# Patient Record
Sex: FEMALE | Race: WHITE | Employment: PART TIME | URBAN - METROPOLITAN AREA
[De-identification: names, ages, dates, MRNs, and addresses within clinical notes are randomized per-mention and may not be internally consistent; named-entity substitution may affect disease eponyms.]

---

## 2017-02-07 ENCOUNTER — OFFICE VISIT (OUTPATIENT)
Dept: SURGERY | Age: 57
End: 2017-02-07

## 2017-02-07 VITALS — SYSTOLIC BLOOD PRESSURE: 124 MMHG | BODY MASS INDEX: 22.76 KG/M2 | WEIGHT: 141 LBS | DIASTOLIC BLOOD PRESSURE: 80 MMHG

## 2017-02-07 DIAGNOSIS — K44.9 HIATAL HERNIA: Primary | ICD-10-CM

## 2017-02-07 DIAGNOSIS — K21.00 HIATAL HERNIA WITH GERD AND ESOPHAGITIS: ICD-10-CM

## 2017-02-07 DIAGNOSIS — K44.9 HIATAL HERNIA WITH GERD AND ESOPHAGITIS: ICD-10-CM

## 2017-02-07 PROCEDURE — 99203 OFFICE O/P NEW LOW 30 MIN: CPT | Performed by: SURGERY

## 2017-02-07 RX ORDER — INSULIN DETEMIR 100 [IU]/ML
28 INJECTION, SOLUTION SUBCUTANEOUS NIGHTLY
COMMUNITY
Start: 2016-11-14

## 2017-02-07 ASSESSMENT — ENCOUNTER SYMPTOMS
GASTROINTESTINAL NEGATIVE: 1
ALLERGIC/IMMUNOLOGIC NEGATIVE: 1
EYES NEGATIVE: 1
RESPIRATORY NEGATIVE: 1

## 2017-02-13 ENCOUNTER — TELEPHONE (OUTPATIENT)
Dept: SURGERY | Age: 57
End: 2017-02-13

## 2017-02-13 ENCOUNTER — HOSPITAL ENCOUNTER (OUTPATIENT)
Dept: GENERAL RADIOLOGY | Age: 57
Discharge: HOME | End: 2017-02-13
Attending: SURGERY | Admitting: SURGERY

## 2017-02-13 DIAGNOSIS — K44.9 HIATAL HERNIA: ICD-10-CM

## 2017-02-13 DIAGNOSIS — K44.9 DIAPHRAGMATIC HERNIA WITHOUT OBSTRUCTION OR GANGRENE: ICD-10-CM

## 2017-03-03 ENCOUNTER — HOSPITAL ENCOUNTER (OUTPATIENT)
Dept: OTHER | Age: 57
Discharge: HOME | End: 2017-03-03
Attending: SURGERY | Admitting: SURGERY

## 2017-03-03 LAB
ABO/RH: NORMAL
ANION GAP SERPL CALCULATED.3IONS-SCNC: 17 MMOL/L (ref 3–16)
ANTIBODY SCREEN: NORMAL
BASOPHILS ABSOLUTE: 0.1 K/UL (ref 0–0.2)
BASOPHILS RELATIVE PERCENT: 0.7 %
BUN BLDV-MCNC: 23 MG/DL (ref 7–20)
CALCIUM SERPL-MCNC: 9.8 MG/DL (ref 8.3–10.6)
CHLORIDE BLD-SCNC: 92 MMOL/L (ref 99–110)
CO2: 26 MMOL/L (ref 21–32)
CREAT SERPL-MCNC: 0.9 MG/DL (ref 0.6–1.1)
EOSINOPHILS ABSOLUTE: 0.5 K/UL (ref 0–0.6)
EOSINOPHILS RELATIVE PERCENT: 6.7 %
GFR AFRICAN AMERICAN: >60
GFR NON-AFRICAN AMERICAN: >60
GLUCOSE BLD-MCNC: 403 MG/DL (ref 70–99)
HCT VFR BLD CALC: 40.6 % (ref 36–48)
HEMOGLOBIN: 13.6 G/DL (ref 12–16)
LYMPHOCYTES ABSOLUTE: 2.4 K/UL (ref 1–5.1)
LYMPHOCYTES RELATIVE PERCENT: 30.2 %
MCH RBC QN AUTO: 30.8 PG (ref 26–34)
MCHC RBC AUTO-ENTMCNC: 33.4 G/DL (ref 31–36)
MCV RBC AUTO: 92.1 FL (ref 80–100)
MONOCYTES ABSOLUTE: 0.6 K/UL (ref 0–1.3)
MONOCYTES RELATIVE PERCENT: 6.9 %
NEUTROPHILS ABSOLUTE: 4.5 K/UL (ref 1.7–7.7)
NEUTROPHILS RELATIVE PERCENT: 55.5 %
PDW BLD-RTO: 12.5 % (ref 12.4–15.4)
PLATELET # BLD: 199 K/UL (ref 135–450)
PMV BLD AUTO: 10.8 FL (ref 5–10.5)
POTASSIUM SERPL-SCNC: 4.4 MMOL/L (ref 3.5–5.1)
RBC # BLD: 4.42 M/UL (ref 4–5.2)
SODIUM BLD-SCNC: 135 MMOL/L (ref 136–145)
WBC # BLD: 8.1 K/UL (ref 4–11)

## 2017-03-04 LAB
EKG ATRIAL RATE: 82 BPM
EKG DIAGNOSIS: NORMAL
EKG P AXIS: 53 DEGREES
EKG P-R INTERVAL: 200 MS
EKG Q-T INTERVAL: 368 MS
EKG QRS DURATION: 76 MS
EKG QTC CALCULATION (BAZETT): 429 MS
EKG R AXIS: 18 DEGREES
EKG T AXIS: 70 DEGREES
EKG VENTRICULAR RATE: 82 BPM

## 2017-03-04 PROCEDURE — 93010 ELECTROCARDIOGRAM REPORT: CPT | Performed by: INTERNAL MEDICINE

## 2017-03-09 ENCOUNTER — HOSPITAL ENCOUNTER (OUTPATIENT)
Dept: ENDOSCOPY | Age: 57
Discharge: HOME | End: 2017-03-06
Attending: INTERNAL MEDICINE | Admitting: INTERNAL MEDICINE

## 2017-03-10 ENCOUNTER — HOSPITAL ENCOUNTER (OUTPATIENT)
Dept: SURGERY | Age: 57
Discharge: HOME | End: 2017-02-14
Attending: SURGERY | Admitting: SURGERY

## 2017-03-10 DIAGNOSIS — K44.9 DIAPHRAGMATIC HERNIA WITHOUT OBSTRUCTION OR GANGRENE: ICD-10-CM

## 2017-03-13 ENCOUNTER — HOSPITAL ENCOUNTER (OUTPATIENT)
Dept: ENDOSCOPY | Age: 57
Discharge: HOME | End: 2017-03-13
Attending: INTERNAL MEDICINE | Admitting: INTERNAL MEDICINE

## 2017-03-13 LAB
GLUCOSE BLD-MCNC: 286 MG/DL (ref 70–99)
GLUCOSE BLD-MCNC: 289 MG/DL (ref 70–99)
PERFORMED ON: ABNORMAL
PERFORMED ON: ABNORMAL

## 2017-04-06 ENCOUNTER — HOSPITAL ENCOUNTER (OUTPATIENT)
Dept: ENDOSCOPY | Age: 57
Discharge: HOME | End: 2017-04-06
Attending: INTERNAL MEDICINE | Admitting: INTERNAL MEDICINE

## 2017-04-06 VITALS
SYSTOLIC BLOOD PRESSURE: 158 MMHG | OXYGEN SATURATION: 97 % | DIASTOLIC BLOOD PRESSURE: 115 MMHG | HEART RATE: 104 BPM | RESPIRATION RATE: 16 BRPM

## 2017-04-27 ENCOUNTER — OFFICE VISIT (OUTPATIENT)
Dept: SURGERY | Age: 57
End: 2017-04-27

## 2017-04-27 VITALS — SYSTOLIC BLOOD PRESSURE: 160 MMHG | WEIGHT: 134 LBS | DIASTOLIC BLOOD PRESSURE: 90 MMHG | BODY MASS INDEX: 21.63 KG/M2

## 2017-04-27 DIAGNOSIS — K44.9 PARAESOPHAGEAL HIATAL HERNIA: Primary | ICD-10-CM

## 2017-04-27 PROCEDURE — 99024 POSTOP FOLLOW-UP VISIT: CPT | Performed by: SURGERY

## 2017-04-27 RX ORDER — SULFAMETHOXAZOLE AND TRIMETHOPRIM 800; 160 MG/1; MG/1
1 TABLET ORAL 2 TIMES DAILY
Qty: 20 TABLET | Refills: 0 | Status: SHIPPED | OUTPATIENT
Start: 2017-04-27 | End: 2017-05-07

## 2017-05-18 ENCOUNTER — OFFICE VISIT (OUTPATIENT)
Dept: SURGERY | Age: 57
End: 2017-05-18

## 2017-05-18 VITALS — SYSTOLIC BLOOD PRESSURE: 130 MMHG | DIASTOLIC BLOOD PRESSURE: 80 MMHG | WEIGHT: 132 LBS | BODY MASS INDEX: 21.31 KG/M2

## 2017-05-18 DIAGNOSIS — K44.9 PARAESOPHAGEAL HIATAL HERNIA: Primary | ICD-10-CM

## 2017-05-18 PROCEDURE — 99024 POSTOP FOLLOW-UP VISIT: CPT | Performed by: SURGERY

## 2019-10-07 ENCOUNTER — OFFICE VISIT (OUTPATIENT)
Dept: ENT CLINIC | Age: 59
End: 2019-10-07
Payer: COMMERCIAL

## 2019-10-07 VITALS — SYSTOLIC BLOOD PRESSURE: 124 MMHG | OXYGEN SATURATION: 94 % | HEART RATE: 86 BPM | DIASTOLIC BLOOD PRESSURE: 72 MMHG

## 2019-10-07 DIAGNOSIS — R04.0 ACUTE ANTERIOR EPISTAXIS: Primary | ICD-10-CM

## 2019-10-07 PROCEDURE — 1036F TOBACCO NON-USER: CPT | Performed by: OTOLARYNGOLOGY

## 2019-10-07 PROCEDURE — 3017F COLORECTAL CA SCREEN DOC REV: CPT | Performed by: OTOLARYNGOLOGY

## 2019-10-07 PROCEDURE — 99202 OFFICE O/P NEW SF 15 MIN: CPT | Performed by: OTOLARYNGOLOGY

## 2019-10-07 PROCEDURE — G8421 BMI NOT CALCULATED: HCPCS | Performed by: OTOLARYNGOLOGY

## 2019-10-07 PROCEDURE — G8427 DOCREV CUR MEDS BY ELIG CLIN: HCPCS | Performed by: OTOLARYNGOLOGY

## 2019-10-07 PROCEDURE — 30901 CONTROL OF NOSEBLEED: CPT | Performed by: OTOLARYNGOLOGY

## 2019-10-07 PROCEDURE — G8484 FLU IMMUNIZE NO ADMIN: HCPCS | Performed by: OTOLARYNGOLOGY

## 2019-10-07 ASSESSMENT — ENCOUNTER SYMPTOMS
EYES NEGATIVE: 1
SINUS PRESSURE: 0
SORE THROAT: 0
VOICE CHANGE: 0
SINUS PAIN: 0
TROUBLE SWALLOWING: 0
RHINORRHEA: 0
FACIAL SWELLING: 0
ALLERGIC/IMMUNOLOGIC NEGATIVE: 1
RESPIRATORY NEGATIVE: 1

## 2019-10-10 ENCOUNTER — TELEPHONE (OUTPATIENT)
Dept: ENT CLINIC | Age: 59
End: 2019-10-10

## 2019-10-14 ENCOUNTER — TELEPHONE (OUTPATIENT)
Dept: ENT CLINIC | Age: 59
End: 2019-10-14

## 2019-10-15 ENCOUNTER — OFFICE VISIT (OUTPATIENT)
Dept: ENT CLINIC | Age: 59
End: 2019-10-15
Payer: MEDICAID

## 2019-10-15 VITALS — HEART RATE: 77 BPM | OXYGEN SATURATION: 94 % | DIASTOLIC BLOOD PRESSURE: 76 MMHG | SYSTOLIC BLOOD PRESSURE: 114 MMHG

## 2019-10-15 DIAGNOSIS — R04.0 ACUTE ANTERIOR EPISTAXIS: Primary | ICD-10-CM

## 2019-10-15 PROCEDURE — G8484 FLU IMMUNIZE NO ADMIN: HCPCS | Performed by: OTOLARYNGOLOGY

## 2019-10-15 PROCEDURE — G8427 DOCREV CUR MEDS BY ELIG CLIN: HCPCS | Performed by: OTOLARYNGOLOGY

## 2019-10-15 PROCEDURE — 30901 CONTROL OF NOSEBLEED: CPT | Performed by: OTOLARYNGOLOGY

## 2019-10-15 PROCEDURE — 1036F TOBACCO NON-USER: CPT | Performed by: OTOLARYNGOLOGY

## 2019-10-15 PROCEDURE — G8421 BMI NOT CALCULATED: HCPCS | Performed by: OTOLARYNGOLOGY

## 2019-10-15 PROCEDURE — 3017F COLORECTAL CA SCREEN DOC REV: CPT | Performed by: OTOLARYNGOLOGY

## 2020-11-03 ENCOUNTER — OFFICE VISIT (OUTPATIENT)
Dept: ENT CLINIC | Age: 60
End: 2020-11-03
Payer: MEDICARE

## 2020-11-03 VITALS
WEIGHT: 132 LBS | HEART RATE: 85 BPM | SYSTOLIC BLOOD PRESSURE: 165 MMHG | DIASTOLIC BLOOD PRESSURE: 101 MMHG | BODY MASS INDEX: 21.31 KG/M2 | TEMPERATURE: 97.7 F

## 2020-11-03 PROCEDURE — G8484 FLU IMMUNIZE NO ADMIN: HCPCS | Performed by: OTOLARYNGOLOGY

## 2020-11-03 PROCEDURE — G8427 DOCREV CUR MEDS BY ELIG CLIN: HCPCS | Performed by: OTOLARYNGOLOGY

## 2020-11-03 PROCEDURE — G8420 CALC BMI NORM PARAMETERS: HCPCS | Performed by: OTOLARYNGOLOGY

## 2020-11-03 PROCEDURE — 1036F TOBACCO NON-USER: CPT | Performed by: OTOLARYNGOLOGY

## 2020-11-03 PROCEDURE — 30903 CONTROL OF NOSEBLEED: CPT | Performed by: OTOLARYNGOLOGY

## 2020-11-03 PROCEDURE — 3017F COLORECTAL CA SCREEN DOC REV: CPT | Performed by: OTOLARYNGOLOGY

## 2020-11-03 RX ORDER — AZITHROMYCIN 250 MG/1
TABLET, FILM COATED ORAL
Qty: 1 PACKET | Refills: 0 | Status: SHIPPED | OUTPATIENT
Start: 2020-11-03 | End: 2022-03-31

## 2020-11-03 RX ORDER — LOSARTAN POTASSIUM 25 MG/1
25 TABLET ORAL DAILY
COMMUNITY
Start: 2020-05-18 | End: 2022-09-12

## 2020-11-03 RX ORDER — ROSUVASTATIN CALCIUM 20 MG/1
20 TABLET, COATED ORAL DAILY
COMMUNITY
Start: 2020-07-15 | End: 2021-09-29

## 2020-11-03 RX ORDER — OXYBUTYNIN CHLORIDE 5 MG/1
5 TABLET ORAL 3 TIMES DAILY
COMMUNITY
Start: 2020-05-18

## 2020-11-03 RX ORDER — DULAGLUTIDE 1.5 MG/.5ML
INJECTION, SOLUTION SUBCUTANEOUS
COMMUNITY

## 2020-11-05 ENCOUNTER — OFFICE VISIT (OUTPATIENT)
Dept: ENT CLINIC | Age: 60
End: 2020-11-05
Payer: MEDICARE

## 2020-11-05 VITALS
TEMPERATURE: 97.5 F | HEIGHT: 66 IN | WEIGHT: 141 LBS | OXYGEN SATURATION: 98 % | DIASTOLIC BLOOD PRESSURE: 87 MMHG | SYSTOLIC BLOOD PRESSURE: 134 MMHG | RESPIRATION RATE: 16 BRPM | BODY MASS INDEX: 22.66 KG/M2 | HEART RATE: 77 BPM

## 2020-11-05 PROCEDURE — 1036F TOBACCO NON-USER: CPT | Performed by: OTOLARYNGOLOGY

## 2020-11-05 PROCEDURE — 99213 OFFICE O/P EST LOW 20 MIN: CPT | Performed by: OTOLARYNGOLOGY

## 2020-11-05 PROCEDURE — G8420 CALC BMI NORM PARAMETERS: HCPCS | Performed by: OTOLARYNGOLOGY

## 2020-11-05 PROCEDURE — 3017F COLORECTAL CA SCREEN DOC REV: CPT | Performed by: OTOLARYNGOLOGY

## 2020-11-05 PROCEDURE — G8427 DOCREV CUR MEDS BY ELIG CLIN: HCPCS | Performed by: OTOLARYNGOLOGY

## 2020-11-05 PROCEDURE — G8484 FLU IMMUNIZE NO ADMIN: HCPCS | Performed by: OTOLARYNGOLOGY

## 2020-11-05 NOTE — PROGRESS NOTES
Patient is here 2 days following packing of the right side of her nose due to an acute epistaxis of the anterior portion. She is on Xarelto. No further bleeding has been encountered. This is been despite placement of the Gelfoam and the taping. Taping and a Gelfoam have been removed. No further bleeding is encountered and no other abnormalities are present at this time. Oral examination continues to be normal as well. And ears are clear of any fluid and no cerumen is noted. The neck is free of adenopathy, mass, thyroid enlargement. I have explained to her in depth that Xarelto makes it more likely that she will have a bleeding episode and that most of it is due to the dryness of the nasal membranes. I have suggested that she use saline nose spray 3 times a day for at least the next 2 weeks and we also will add Bactroban ointment to be used twice daily for the next 2 weeks as well. She will return as needed.

## 2020-11-13 ENCOUNTER — OFFICE VISIT (OUTPATIENT)
Dept: ENT CLINIC | Age: 60
End: 2020-11-13
Payer: MEDICARE

## 2020-11-13 VITALS
HEIGHT: 66 IN | BODY MASS INDEX: 22.02 KG/M2 | RESPIRATION RATE: 16 BRPM | WEIGHT: 137 LBS | OXYGEN SATURATION: 94 % | HEART RATE: 72 BPM | SYSTOLIC BLOOD PRESSURE: 160 MMHG | DIASTOLIC BLOOD PRESSURE: 80 MMHG | TEMPERATURE: 97.3 F

## 2020-11-13 PROCEDURE — G8427 DOCREV CUR MEDS BY ELIG CLIN: HCPCS | Performed by: OTOLARYNGOLOGY

## 2020-11-13 PROCEDURE — 1036F TOBACCO NON-USER: CPT | Performed by: OTOLARYNGOLOGY

## 2020-11-13 PROCEDURE — 3017F COLORECTAL CA SCREEN DOC REV: CPT | Performed by: OTOLARYNGOLOGY

## 2020-11-13 PROCEDURE — G8484 FLU IMMUNIZE NO ADMIN: HCPCS | Performed by: OTOLARYNGOLOGY

## 2020-11-13 PROCEDURE — G8420 CALC BMI NORM PARAMETERS: HCPCS | Performed by: OTOLARYNGOLOGY

## 2020-11-13 PROCEDURE — 99213 OFFICE O/P EST LOW 20 MIN: CPT | Performed by: OTOLARYNGOLOGY

## 2020-11-13 RX ORDER — METHYLPREDNISOLONE 4 MG/1
4 TABLET ORAL SEE ADMIN INSTRUCTIONS
Qty: 1 KIT | Refills: 0 | Status: SHIPPED | OUTPATIENT
Start: 2020-11-13 | End: 2021-03-31

## 2020-11-13 NOTE — PROGRESS NOTES
Patient continues to have some epistaxis on the right side on a daily basis but not severe. She is using the Coban ointment but not on a regular basis. There has been an increase in hearing loss noted somewhat greater on the right than the left. This was noted on a recent audiogram but I do not have the results at this time. She has had pain in the right ear on occasion but no dizziness. Tinnitus has been a chronic problem but not increasing. There is no history of noise exposure or trauma. No history of family hearing loss is apparent. She does not smoke. Currently, she appears in no obvious acute distress. Ear examination reveals normal-appearing tympanic membranes and ear canals bilaterally. I cannot detect any evidence of infection or inflammation. Oral examination is unremarkable. The nasal mucosa reveals some crusting primarily in the anterior septum on the right side but no active bleeding is apparent. She has been treated for this in the past.  Otherwise the nasal examination is unremarkable. The neck is free of adenopathy, mass, thyroid enlargement. There is no nystagmus present. My suspicion is that it is primarily a sensorineural hearing loss but I will require the recent audiogram to make that determination final.  However, we will try a course of Medrol Dosepak. Since she is diabetic, I have asked that she monitor her blood sugar more closely. I will obtain a copy of the audiogram.  I have instructed her to be sure and use the Bactroban ointment twice daily in her nose particularly on the right side. I will see her again in 2 weeks.

## 2020-12-02 ENCOUNTER — OFFICE VISIT (OUTPATIENT)
Dept: ENT CLINIC | Age: 60
End: 2020-12-02
Payer: MEDICARE

## 2020-12-02 VITALS
OXYGEN SATURATION: 93 % | SYSTOLIC BLOOD PRESSURE: 171 MMHG | TEMPERATURE: 98.5 F | HEIGHT: 65 IN | WEIGHT: 139 LBS | HEART RATE: 93 BPM | DIASTOLIC BLOOD PRESSURE: 82 MMHG | BODY MASS INDEX: 23.16 KG/M2

## 2020-12-02 PROCEDURE — G8428 CUR MEDS NOT DOCUMENT: HCPCS | Performed by: OTOLARYNGOLOGY

## 2020-12-02 PROCEDURE — 1036F TOBACCO NON-USER: CPT | Performed by: OTOLARYNGOLOGY

## 2020-12-02 PROCEDURE — G8420 CALC BMI NORM PARAMETERS: HCPCS | Performed by: OTOLARYNGOLOGY

## 2020-12-02 PROCEDURE — G8484 FLU IMMUNIZE NO ADMIN: HCPCS | Performed by: OTOLARYNGOLOGY

## 2020-12-02 PROCEDURE — 99212 OFFICE O/P EST SF 10 MIN: CPT | Performed by: OTOLARYNGOLOGY

## 2020-12-02 PROCEDURE — 30901 CONTROL OF NOSEBLEED: CPT | Performed by: OTOLARYNGOLOGY

## 2020-12-02 PROCEDURE — 3017F COLORECTAL CA SCREEN DOC REV: CPT | Performed by: OTOLARYNGOLOGY

## 2020-12-02 RX ORDER — ACETAMINOPHEN AND CODEINE PHOSPHATE 300; 30 MG/1; MG/1
1 TABLET ORAL EVERY 4 HOURS PRN
Qty: 20 TABLET | Refills: 0 | OUTPATIENT
Start: 2020-12-02 | End: 2020-12-09

## 2020-12-02 RX ORDER — AZITHROMYCIN 250 MG/1
TABLET, FILM COATED ORAL
Qty: 1 PACKET | Refills: 0 | Status: SHIPPED | OUTPATIENT
Start: 2020-12-02 | End: 2022-03-31

## 2020-12-02 RX ORDER — AZITHROMYCIN 250 MG/1
TABLET, FILM COATED ORAL
Qty: 1 PACKET | Refills: 0 | Status: SHIPPED | OUTPATIENT
Start: 2020-12-02 | End: 2020-12-02 | Stop reason: SDUPTHER

## 2020-12-02 RX ORDER — ACETAMINOPHEN AND CODEINE PHOSPHATE 300; 30 MG/1; MG/1
1 TABLET ORAL EVERY 4 HOURS PRN
Qty: 15 TABLET | Refills: 0 | Status: SHIPPED | OUTPATIENT
Start: 2020-12-02 | End: 2020-12-02 | Stop reason: SDUPTHER

## 2020-12-02 NOTE — PROGRESS NOTES
Patient continues to have recurring epistaxis on the right side. Nothing posterior has been apparent. She is on Xarelto. No fevers been noted. There is been no history of trauma. Currently, she appears in no obvious acute distress. Ear examination continues to reveal normal-appearing tympanic membranes and ear canals bilaterally. Oral examination is unremarkable with no evidence of posterior bleeding. The neck is free of any adenopathy, mass, thyroid enlargement. Nasal mucosa treated with cotton pledgets  impregnated with Afrin and lidocaine placed in middle meatuses against turbinates. Pledgets left in place for five minutes and removed enabling enhanced visualization. The exam revealed positive edema of mucosa. it was negative for erythema indicative of infection. There was evidence of deviation of the septum which was not significant. There were not polyps present. .There were not other masses present. The turbinates were not enlarged beyond normal.  There is apparently some acute bleeding which appears to be coming from the middle turbinate on the right side. There is also some possibility on the anterior portion of the cartilaginous septum. In view of the recurring nature, it is determined it would be better to pack the nose. Xeroform gauze packing is placed on the right side and the nose is taped. A mustache dressing is applied. No bleeding is noted on the left side. She is instructed on activity and application of ice to the area. We will start her on Zithromax and Tylenol with codeine for any pain. I will see her again on Friday for removal of the packing. She may need attention in the operating room for cauterization of the area  with electric current.

## 2020-12-04 ENCOUNTER — OFFICE VISIT (OUTPATIENT)
Dept: ENT CLINIC | Age: 60
End: 2020-12-04
Payer: MEDICARE

## 2020-12-04 VITALS
WEIGHT: 139 LBS | HEART RATE: 78 BPM | BODY MASS INDEX: 23.13 KG/M2 | DIASTOLIC BLOOD PRESSURE: 92 MMHG | TEMPERATURE: 97.5 F | SYSTOLIC BLOOD PRESSURE: 156 MMHG

## 2020-12-04 PROCEDURE — 1036F TOBACCO NON-USER: CPT | Performed by: OTOLARYNGOLOGY

## 2020-12-04 PROCEDURE — G8484 FLU IMMUNIZE NO ADMIN: HCPCS | Performed by: OTOLARYNGOLOGY

## 2020-12-04 PROCEDURE — G8427 DOCREV CUR MEDS BY ELIG CLIN: HCPCS | Performed by: OTOLARYNGOLOGY

## 2020-12-04 PROCEDURE — 99213 OFFICE O/P EST LOW 20 MIN: CPT | Performed by: OTOLARYNGOLOGY

## 2020-12-04 PROCEDURE — 3017F COLORECTAL CA SCREEN DOC REV: CPT | Performed by: OTOLARYNGOLOGY

## 2020-12-04 PROCEDURE — G8420 CALC BMI NORM PARAMETERS: HCPCS | Performed by: OTOLARYNGOLOGY

## 2020-12-04 NOTE — PROGRESS NOTES
Patient relates that there has been no further bleeding post packing in the right side. Currently, there is no active bleeding noted. Packing is removed without incident from the right side. Cotton impregnated with Afrin and lidocaine are inserted for 5 minutes and then removed. There is some slight redness noted primarily in the area of the middle turbinate but I do not see any mass lesions or any other significant bleeding. Gelfoam pack impregnated with Polysporin ointment is applied to be on the safe side. I have suggested a sinus CT scan to be obtained to further evaluate the problem. Otherwise, she will continue current medication. Further follow-up will be depend upon the findings.

## 2020-12-11 ENCOUNTER — OFFICE VISIT (OUTPATIENT)
Dept: ENT CLINIC | Age: 60
End: 2020-12-11
Payer: MEDICARE

## 2020-12-11 ENCOUNTER — TELEPHONE (OUTPATIENT)
Dept: OTOLARYNGOLOGY | Age: 60
End: 2020-12-11

## 2020-12-11 VITALS — HEART RATE: 76 BPM | SYSTOLIC BLOOD PRESSURE: 134 MMHG | TEMPERATURE: 97.7 F | DIASTOLIC BLOOD PRESSURE: 85 MMHG

## 2020-12-11 PROCEDURE — G8427 DOCREV CUR MEDS BY ELIG CLIN: HCPCS | Performed by: OTOLARYNGOLOGY

## 2020-12-11 PROCEDURE — 99213 OFFICE O/P EST LOW 20 MIN: CPT | Performed by: OTOLARYNGOLOGY

## 2020-12-11 PROCEDURE — 3017F COLORECTAL CA SCREEN DOC REV: CPT | Performed by: OTOLARYNGOLOGY

## 2020-12-11 PROCEDURE — G8484 FLU IMMUNIZE NO ADMIN: HCPCS | Performed by: OTOLARYNGOLOGY

## 2020-12-11 PROCEDURE — 1036F TOBACCO NON-USER: CPT | Performed by: OTOLARYNGOLOGY

## 2020-12-11 PROCEDURE — G8420 CALC BMI NORM PARAMETERS: HCPCS | Performed by: OTOLARYNGOLOGY

## 2020-12-11 RX ORDER — PREDNISONE 10 MG/1
TABLET ORAL
Qty: 25 TABLET | Refills: 0 | Status: SHIPPED | OUTPATIENT
Start: 2020-12-11 | End: 2021-03-31

## 2020-12-11 NOTE — PROGRESS NOTES
Patient has had no further bleeding from her nose on the right side. However, she has gone to the audiologist for hearing aids and testing demonstrated some conductive component primarily to her left ear. She is going to have a sinus CT scan Monday as requested. No fevers been noted and no ear pain is been present. Currently, she appears in no acute distress. Ear examination does not reveal any significant degree of fluid or inflammation on either side. Certainly, there is no need for PE tube placement. Oral examination is unremarkable. The nasal mucosa is somewhat dry and no active bleeding is apparent. The neck is free of any adenopathy, mass, thyroid enlargement. Is quite possible that given her problem with recurring epistaxis requiring therapy that the conductive component is related to some eustachian tube dysfunction but not to the point where she will need PE tubes. I have placed her on prednisone for this. She will require hearing aids and I would suggest that she proceed with those despite needing current therapy. In addition, final determination as to problems are likely related to what is believed be found on the sinus CT scan. Problem may be more been allergic problem.

## 2020-12-14 ENCOUNTER — HOSPITAL ENCOUNTER (OUTPATIENT)
Dept: CT IMAGING | Age: 60
Discharge: HOME | End: 2020-12-14
Payer: MEDICARE

## 2020-12-14 PROCEDURE — 70486 CT MAXILLOFACIAL W/O DYE: CPT

## 2020-12-16 ENCOUNTER — TELEPHONE (OUTPATIENT)
Dept: ENT CLINIC | Age: 60
End: 2020-12-16

## 2020-12-16 NOTE — TELEPHONE ENCOUNTER
Spoke with patient about CT results. Patient is asking about hearing aids and if you would like to see her back in the office. Dr. Enriqueta Gaitan told her to follow up with you.  Please advise

## 2021-03-12 ENCOUNTER — TELEPHONE (OUTPATIENT)
Dept: ENT CLINIC | Age: 61
End: 2021-03-12

## 2021-03-12 ENCOUNTER — OFFICE VISIT (OUTPATIENT)
Dept: ENT CLINIC | Age: 61
End: 2021-03-12
Payer: MEDICARE

## 2021-03-12 VITALS
WEIGHT: 139 LBS | BODY MASS INDEX: 23.13 KG/M2 | HEART RATE: 70 BPM | SYSTOLIC BLOOD PRESSURE: 191 MMHG | DIASTOLIC BLOOD PRESSURE: 90 MMHG | TEMPERATURE: 97.3 F

## 2021-03-12 DIAGNOSIS — R04.0 ACUTE ANTERIOR EPISTAXIS: Primary | ICD-10-CM

## 2021-03-12 PROCEDURE — 3017F COLORECTAL CA SCREEN DOC REV: CPT | Performed by: OTOLARYNGOLOGY

## 2021-03-12 PROCEDURE — G8427 DOCREV CUR MEDS BY ELIG CLIN: HCPCS | Performed by: OTOLARYNGOLOGY

## 2021-03-12 PROCEDURE — G8484 FLU IMMUNIZE NO ADMIN: HCPCS | Performed by: OTOLARYNGOLOGY

## 2021-03-12 PROCEDURE — 30903 CONTROL OF NOSEBLEED: CPT | Performed by: OTOLARYNGOLOGY

## 2021-03-12 PROCEDURE — G8420 CALC BMI NORM PARAMETERS: HCPCS | Performed by: OTOLARYNGOLOGY

## 2021-03-12 PROCEDURE — 1036F TOBACCO NON-USER: CPT | Performed by: OTOLARYNGOLOGY

## 2021-03-12 RX ORDER — CEPHALEXIN 500 MG/1
500 CAPSULE ORAL 3 TIMES DAILY
Qty: 21 CAPSULE | Refills: 0 | Status: SHIPPED | OUTPATIENT
Start: 2021-03-12 | End: 2021-03-19

## 2021-03-12 NOTE — PROGRESS NOTES
Patient relates that she has had recurring epistaxis in the right side of her nose has been more prevalent over the last few weeks. Pressure and placement of tissue seems to stop the bleeding over period of time and it has not been packed. She is on blood thinning medication. No fevers been noted. Currently, she appears in no obvious acute distress. Ear examination reveals normal-appearing tympanic membranes and ear canals bilaterally. Oral examination is unremarkable. The neck is free of adenopathy, mass, thyroid enlargement. Nasal mucosa treated with cotton pledgets  impregnated with Afrin and lidocaine placed in middle meatuses against turbinates. Pledgets left in place for five minutes and removed enabling enhanced visualization. The exam revealed positive edema of mucosa. it was negative for erythema indicative of infection. There was not evidence of deviation of the septum which was not significant. There were not polyps present. .There were not other masses present. The turbinates were not enlarged beyond normal.  Active bleeding site is noted on the anterior end of the cartilaginous septum on the right side which appears to be a superficial vein more prominent in nature. This is treated with the Afrin and silver nitrate cautery with minimal success and therefore a Gelfoam pack impregnated with Polysporin ointment was placed. This did resolve the problem. At this point, I do feel that electrocauterization in the hospital setting on an outpatient basis would be most appropriate. I have explained to her the need to not blow her nose for the next 3 days while this packing dissolves and that she should now take Keflex for 1 week. She is instructed to apply ice and avoid bending or lifting over the next couple of days. Surgery would not be scheduled. She will need to stop her blood thinning medication for 3 days prior to the procedure.

## 2021-03-12 NOTE — LETTER
Surgery Scheduling Form  PATIENT DEMOGRAPHICS:  Patient Name:  Gorge Resendiz  Patient :  1960   Patient SS#:      Patient Phone:  [phone] (home)  Alt. Patient Phone:    Patient Address:  [address]  PCP:  Karina LOPEZ   Insurance:  Payor: Maribell Hair / Plan: [facility] HMO / Product Type: *No Product type* /   Insurance ID Number:    Payor/Plan Subscr  Sex Relation Sub. Ins. ID Effective Group Num   1. [employer] MEDICAOneal Clos 1960 Female Self [id number]                                   [address] [address]   2. [address] 1960 Female Self [id number]                                   [address] [address]     Allergies: Metformin hcl, Lisinopril, and Glipizide  DIAGNOSIS & PROCEDURE:  Diagnosis:   Acute Anterior Epistaxis  Operation: Cauterization (Right Nasal Septum)  Location:  [address]  Surgeon:  Zev Arguello MD   [facility] INFORMATION:   Surgeon's Scheduling Instruction:  elective  Requested Date: 2021   OR Time: 0845  Patient Arrival Time: 0715  OR Time Required:  15  Minutes  Anesthesia:  General  Equipment:     Status:  Outpatient  PAT Required:  In Epic  Pre-op H & P:  PCP  Special Comments:            Date:  3/15/21  Time: 1002  Confirmation Number________________________  PRE-CERTIFICATION INFORMATION:  ICD 10 Code: R04.0       CPT Code: 18124

## 2021-03-12 NOTE — LETTER
[facility] Nose Throat  [address] [facility] [facility] [address]  [address] [address]  Phone: [phone]  Fax: [phone]    Blair Lindsey MD        March 12, 2021     Patient: Radha Sahni   YOB: 1960   Date of Visit: 3/12/2021       To Whom It May Concern: It is my medical opinion that Shanell Omalley may return to work on 03/15/2021. If you have any questions or concerns, please don't hesitate to call.     Sincerely,        Blair Lindsey MD

## 2021-03-15 ENCOUNTER — TELEPHONE (OUTPATIENT)
Dept: ENT CLINIC | Age: 61
End: 2021-03-15

## 2021-03-29 ENCOUNTER — TELEPHONE (OUTPATIENT)
Dept: ENT CLINIC | Age: 61
End: 2021-03-29

## 2021-03-29 NOTE — TELEPHONE ENCOUNTER
Pre-admission testing called the office stating they are unable to get a hold of patient. I know it took Maryam Ling to calling the office to get her scheduled for surgery. I sent the following email to [email or web address]    Tiburcio Rodgers. I am reaching out to you because Pre-admission testing is trying to contact you. They have called both of your phone numbers on your account. If you could please call them back at [phone]. Thanks!   [address] [facility] ENT  [facility] ENT    [facility]  [address] [place] [address],[address]  [place], [address] [place] [address]  P [phone]  F [phone]    [facility]  [address] [facility], [address] [address]  P [phone] Rashad Olivares

## 2021-03-31 NOTE — PROGRESS NOTES
Name_______________________________________Printed:____________________  Date and time of surgery__4//5/2021______0845________________Arrival Time:__0715___masc___________   1. The instructions given regarding when and if a patient needs to stop oral intake prior to surgery varies. Follow the specific instructions you were given                  _x__Nothing to eat or to drink after Midnight the night before.                   ____Carbo loading or ERAS instructions will be given to select patients-if you have been given those instructions -please do the following                           The evening before your surgery after dinner before midnight drink 40 ounces of gatorade. If you are diabetic use sugar free. The morning of surgery drink 40 ounces of water. This needs to be finished 3 hours prior to your surgery start time. 2. Take the following pills with a small sip of water on the morning of surgery_metoprolol, amlodipine__________________________________________________                  Do not take blood pressure medications ending in pril or sartan the jason prior to surgery or the morning of surgery_   3. Aspirin, Ibuprofen, Advil, Naproxen, Vitamin E and other Anti-inflammatory products and supplements should be stopped for 5 -7days before surgery or as directed by your physician. 4. Check with your Doctor regarding stopping Plavix, Coumadin,Eliquis, Lovenox,Effient,Pradaxa,Xarelto, Fragmin or other blood thinners and follow their instructions. 5. Do not smoke, and do not drink any alcoholic beverages 24 hours prior to surgery. This includes NA Beer. Refrain from the usage of any recreational drugs. 6. You may brush your teeth and gargle the morning of surgery. DO NOT SWALLOW WATER   7. You MUST make arrangements for a responsible adult to stay on site while you are here and take you home after your surgery. You will not be allowed to leave alone or drive yourself home.   It is strongly suggested someone stay with you the first 24 hrs. Your surgery will be cancelled if you do not have a ride home. 8. A parent/legal guardian must accompany a child scheduled for surgery and plan to stay at the hospital until the child is discharged. Please do not bring other children with you. 9. Please wear simple, loose fitting clothing to the hospital.  Nadja He not bring valuables (money, credit cards, checkbooks, etc.) Do not wear any makeup (including no eye makeup) or nail polish on your fingers or toes. 10. DO NOT wear any jewelry or piercings on day of surgery. All body piercing jewelry must be removed. 11. If you have ___dentures, they will be removed before going to the OR; we will provide you a container. If you wear ___contact lenses or _x__glasses, they will be removed; please bring a case for them. 12. Please see your family doctor/pediatrician for a history & physical and/or concerning medications. Bring any test results/reports from your physician's office. PCP_Dolores Gee_________________Phone___________H&P Appt. Date________             13 If you  have a Living Will and Durable Power of  for Healthcare, please bring in a copy. 15. Notify your Surgeon if you develop any illness between now and surgery  time, cough, cold, fever, sore throat, nausea, vomiting, etc.  Please notify your surgeon if you experience dizziness, shortness of breath or blurred vision between now & the time of your surgery             15. DO NOT shave your operative site 96 hours prior to surgery. For face & neck surgery, men may use an electric razor 48 hours prior to surgery. 16. Shower the night before or morning of surgery using an antibacterial soap or as you have been instructed. 17. To provide excellent care visitors will be limited to one in the room at any given time. 18.  Please bring picture ID and insurance card.              19. subject to change. All above information reviewed with patient in person or by phone. Patient verbalizes understanding. All questions and concerns addressed.                                                                                                  Patient/Rep_patient__________________                                                                                                                                    PRE OP INSTRUCTIONS

## 2021-04-01 ENCOUNTER — OFFICE VISIT (OUTPATIENT)
Dept: PRIMARY CARE CLINIC | Age: 61
End: 2021-04-01
Payer: MEDICARE

## 2021-04-01 DIAGNOSIS — Z20.828 EXPOSURE TO SARS-ASSOCIATED CORONAVIRUS: Primary | ICD-10-CM

## 2021-04-01 LAB — SARS-COV-2: NOT DETECTED

## 2021-04-01 PROCEDURE — 99211 OFF/OP EST MAY X REQ PHY/QHP: CPT | Performed by: NURSE PRACTITIONER

## 2021-04-01 PROCEDURE — G8428 CUR MEDS NOT DOCUMENT: HCPCS | Performed by: NURSE PRACTITIONER

## 2021-04-01 PROCEDURE — G8420 CALC BMI NORM PARAMETERS: HCPCS | Performed by: NURSE PRACTITIONER

## 2021-04-01 NOTE — PATIENT INSTRUCTIONS
Advance Care Planning  People with COVID-19 may have no symptoms, mild symptoms, such as fever, cough, and shortness of breath or they may have more severe illness, developing severe and fatal pneumonia. As a result, Advance Care Planning with attention to naming a health care decision maker (someone you trust to make healthcare decisions for you if you could not speak for yourself) and sharing other health care preferences is important BEFORE a possible health crisis. Please contact your Primary Care Provider to discuss Advance Care Planning. Preventing the Spread of Coronavirus Disease 2019 in Homes and Residential Communities  For the most recent information go to [email or web address]    Prevention steps for People with confirmed or suspected COVID-19 (including persons under investigation) who do not need to be hospitalized  and   People with confirmed COVID-19 who were hospitalized and determined to be medically stable to go home    Your healthcare provider and public health staff will evaluate whether you can be cared for at home. If it is determined that you do not need to be hospitalized and can be isolated at home, you will be monitored by staff from your local or state health department. You should follow the prevention steps below until a healthcare provider or local or state health department says you can return to your normal activities. Stay home except to get medical care  People who are mildly ill with COVID-19 are able to isolate at home during their illness. You should restrict activities outside your home, except for getting medical care. Do not go to work, school, or public areas. Avoid using public transportation, ride-sharing, or taxis. Separate yourself from other people and animals in your home  People: As much as possible, you should stay in a specific room and away from other people in your home.  Also, you should use a separate before eating or preparing food. If soap and water are not readily available, use an alcohol-based hand  with at least 60% alcohol, covering all surfaces of your hands and rubbing them together until they feel dry. Soap and water are the best option if hands are visibly dirty. Avoid touching your eyes, nose, and mouth with unwashed hands. Avoid sharing personal household items  You should not share dishes, drinking glasses, cups, eating utensils, towels, or bedding with other people or pets in your home. After using these items, they should be washed thoroughly with soap and water. Clean all high-touch surfaces everyday  High touch surfaces include counters, tabletops, doorknobs, bathroom fixtures, toilets, phones, keyboards, tablets, and bedside tables. Also, clean any surfaces that may have blood, stool, or body fluids on them. Use a household cleaning spray or wipe, according to the label instructions. Labels contain instructions for safe and effective use of the cleaning product including precautions you should take when applying the product, such as wearing gloves and making sure you have good ventilation during use of the product. Monitor your symptoms  Seek prompt medical attention if your illness is worsening (e.g., difficulty breathing). Before seeking care, call your healthcare provider and tell them that you have, or are being evaluated for, COVID-19. Put on a facemask before you enter the facility. These steps will help the healthcare providers office to keep other people in the office or waiting room from getting infected or exposed. Ask your healthcare provider to call the local or state health department. Persons who are placed under active monitoring or facilitated self-monitoring should follow instructions provided by their local health department or occupational health professionals, as appropriate. When working with your local health department check their available hours.   If you have a medical emergency and need to call 911, notify the dispatch personnel that you have, or are being evaluated for COVID-19. If possible, put on a facemask before emergency medical services arrive. Discontinuing home isolation  Patients with confirmed COVID-19 should remain under home isolation precautions until the risk of secondary transmission to others is thought to be low. The decision to discontinue home isolation precautions should be made on a case-by-case basis, in consultation with healthcare providers and state and local health departments.

## 2021-04-01 NOTE — PROGRESS NOTES
Vishal Warner received a viral test for COVID-19. They were educated on isolation and quarantine as appropriate. For any symptoms, they were directed to seek care from their PCP, given contact information to establish with a doctor, directed to an urgent care or the emergency room.

## 2021-04-05 ENCOUNTER — ANESTHESIA (OUTPATIENT)
Dept: OPERATING ROOM | Age: 61
End: 2021-04-05
Payer: MEDICARE

## 2021-04-05 ENCOUNTER — ANESTHESIA EVENT (OUTPATIENT)
Dept: OPERATING ROOM | Age: 61
End: 2021-04-05
Payer: MEDICARE

## 2021-04-05 ENCOUNTER — HOSPITAL ENCOUNTER (OUTPATIENT)
Age: 61
Setting detail: OUTPATIENT SURGERY
Discharge: HOME | End: 2021-04-05
Attending: OTOLARYNGOLOGY | Admitting: OTOLARYNGOLOGY
Payer: MEDICARE

## 2021-04-05 VITALS
BODY MASS INDEX: 23.32 KG/M2 | HEART RATE: 74 BPM | SYSTOLIC BLOOD PRESSURE: 180 MMHG | DIASTOLIC BLOOD PRESSURE: 103 MMHG | RESPIRATION RATE: 21 BRPM | HEIGHT: 65 IN | OXYGEN SATURATION: 98 % | WEIGHT: 140 LBS | TEMPERATURE: 97.2 F

## 2021-04-05 VITALS
DIASTOLIC BLOOD PRESSURE: 82 MMHG | RESPIRATION RATE: 15 BRPM | OXYGEN SATURATION: 99 % | SYSTOLIC BLOOD PRESSURE: 133 MMHG

## 2021-04-05 DIAGNOSIS — R04.0 EPISTAXIS, RECURRENT: Primary | ICD-10-CM

## 2021-04-05 LAB
ANION GAP SERPL CALCULATED.3IONS-SCNC: 10 MMOL/L (ref 3–16)
BUN BLDV-MCNC: 19 MG/DL (ref 7–20)
CALCIUM SERPL-MCNC: 9.4 MG/DL (ref 8.3–10.6)
CHLORIDE BLD-SCNC: 106 MMOL/L (ref 99–110)
CO2: 25 MMOL/L (ref 21–32)
CREAT SERPL-MCNC: 0.7 MG/DL (ref 0.6–1.2)
GFR AFRICAN AMERICAN: >60
GFR NON-AFRICAN AMERICAN: >60
GLUCOSE BLD-MCNC: 180 MG/DL (ref 70–99)
GLUCOSE BLD-MCNC: 211 MG/DL (ref 70–99)
GLUCOSE BLD-MCNC: 216 MG/DL (ref 70–99)
HCT VFR BLD CALC: 36.6 % (ref 36–48)
HEMOGLOBIN: 12.2 G/DL (ref 12–16)
MCH RBC QN AUTO: 30.5 PG (ref 26–34)
MCHC RBC AUTO-ENTMCNC: 33.4 G/DL (ref 31–36)
MCV RBC AUTO: 91.4 FL (ref 80–100)
PDW BLD-RTO: 13.4 % (ref 12.4–15.4)
PERFORMED ON: ABNORMAL
PERFORMED ON: ABNORMAL
PLATELET # BLD: 160 K/UL (ref 135–450)
PMV BLD AUTO: 8.7 FL (ref 5–10.5)
POTASSIUM SERPL-SCNC: 4.5 MMOL/L (ref 3.5–5.1)
RBC # BLD: 4 M/UL (ref 4–5.2)
SODIUM BLD-SCNC: 141 MMOL/L (ref 136–145)
WBC # BLD: 5.7 K/UL (ref 4–11)

## 2021-04-05 PROCEDURE — 2580000003 HC RX 258: Performed by: OTOLARYNGOLOGY

## 2021-04-05 PROCEDURE — 3700000001 HC ADD 15 MINUTES (ANESTHESIA): Performed by: OTOLARYNGOLOGY

## 2021-04-05 PROCEDURE — 6360000002 HC RX W HCPCS: Performed by: NURSE ANESTHETIST, CERTIFIED REGISTERED

## 2021-04-05 PROCEDURE — 2709999900 HC NON-CHARGEABLE SUPPLY: Performed by: OTOLARYNGOLOGY

## 2021-04-05 PROCEDURE — 3600000002 HC SURGERY LEVEL 2 BASE: Performed by: OTOLARYNGOLOGY

## 2021-04-05 PROCEDURE — 6370000000 HC RX 637 (ALT 250 FOR IP): Performed by: NURSE ANESTHETIST, CERTIFIED REGISTERED

## 2021-04-05 PROCEDURE — 3600000012 HC SURGERY LEVEL 2 ADDTL 15MIN: Performed by: OTOLARYNGOLOGY

## 2021-04-05 PROCEDURE — 6370000000 HC RX 637 (ALT 250 FOR IP): Performed by: OTOLARYNGOLOGY

## 2021-04-05 PROCEDURE — 7100000001 HC PACU RECOVERY - ADDTL 15 MIN: Performed by: OTOLARYNGOLOGY

## 2021-04-05 PROCEDURE — 85027 COMPLETE CBC AUTOMATED: CPT

## 2021-04-05 PROCEDURE — 7100000000 HC PACU RECOVERY - FIRST 15 MIN: Performed by: OTOLARYNGOLOGY

## 2021-04-05 PROCEDURE — 80048 BASIC METABOLIC PNL TOTAL CA: CPT

## 2021-04-05 PROCEDURE — 7100000010 HC PHASE II RECOVERY - FIRST 15 MIN: Performed by: OTOLARYNGOLOGY

## 2021-04-05 PROCEDURE — 7100000011 HC PHASE II RECOVERY - ADDTL 15 MIN: Performed by: OTOLARYNGOLOGY

## 2021-04-05 PROCEDURE — 30903 CONTROL OF NOSEBLEED: CPT | Performed by: OTOLARYNGOLOGY

## 2021-04-05 PROCEDURE — 2500000003 HC RX 250 WO HCPCS: Performed by: NURSE ANESTHETIST, CERTIFIED REGISTERED

## 2021-04-05 PROCEDURE — 3700000000 HC ANESTHESIA ATTENDED CARE: Performed by: OTOLARYNGOLOGY

## 2021-04-05 RX ORDER — SODIUM CHLORIDE 0.9 % (FLUSH) 0.9 %
10 SYRINGE (ML) INJECTION EVERY 12 HOURS SCHEDULED
Status: DISCONTINUED | OUTPATIENT
Start: 2021-04-05 | End: 2021-04-05 | Stop reason: SDUPTHER

## 2021-04-05 RX ORDER — HYDROCODONE BITARTRATE AND ACETAMINOPHEN 5; 325 MG/1; MG/1
1 TABLET ORAL
Status: DISCONTINUED | OUTPATIENT
Start: 2021-04-05 | End: 2021-04-05 | Stop reason: HOSPADM

## 2021-04-05 RX ORDER — SODIUM CHLORIDE, SODIUM LACTATE, POTASSIUM CHLORIDE, CALCIUM CHLORIDE 600; 310; 30; 20 MG/100ML; MG/100ML; MG/100ML; MG/100ML
INJECTION, SOLUTION INTRAVENOUS CONTINUOUS
Status: DISCONTINUED | OUTPATIENT
Start: 2021-04-05 | End: 2021-04-05 | Stop reason: HOSPADM

## 2021-04-05 RX ORDER — SODIUM CHLORIDE 0.9 % (FLUSH) 0.9 %
10 SYRINGE (ML) INJECTION EVERY 12 HOURS SCHEDULED
Status: DISCONTINUED | OUTPATIENT
Start: 2021-04-05 | End: 2021-04-05 | Stop reason: HOSPADM

## 2021-04-05 RX ORDER — SODIUM CHLORIDE 9 MG/ML
INJECTION, SOLUTION INTRAVENOUS CONTINUOUS
Status: DISCONTINUED | OUTPATIENT
Start: 2021-04-05 | End: 2021-04-05 | Stop reason: HOSPADM

## 2021-04-05 RX ORDER — HYDROMORPHONE HCL 110MG/55ML
0.5 PATIENT CONTROLLED ANALGESIA SYRINGE INTRAVENOUS EVERY 5 MIN PRN
Status: DISCONTINUED | OUTPATIENT
Start: 2021-04-05 | End: 2021-04-05 | Stop reason: HOSPADM

## 2021-04-05 RX ORDER — OXYMETAZOLINE HYDROCHLORIDE 0.05 G/100ML
SPRAY NASAL
Status: COMPLETED | OUTPATIENT
Start: 2021-04-05 | End: 2021-04-05

## 2021-04-05 RX ORDER — ROCURONIUM BROMIDE 10 MG/ML
INJECTION, SOLUTION INTRAVENOUS PRN
Status: DISCONTINUED | OUTPATIENT
Start: 2021-04-05 | End: 2021-04-05 | Stop reason: SDUPTHER

## 2021-04-05 RX ORDER — ONDANSETRON 2 MG/ML
4 INJECTION INTRAMUSCULAR; INTRAVENOUS EVERY 6 HOURS PRN
Status: DISCONTINUED | OUTPATIENT
Start: 2021-04-05 | End: 2021-04-05 | Stop reason: HOSPADM

## 2021-04-05 RX ORDER — PROPOFOL 10 MG/ML
INJECTION, EMULSION INTRAVENOUS PRN
Status: DISCONTINUED | OUTPATIENT
Start: 2021-04-05 | End: 2021-04-05 | Stop reason: SDUPTHER

## 2021-04-05 RX ORDER — METHYLPREDNISOLONE 4 MG/1
4 TABLET ORAL SEE ADMIN INSTRUCTIONS
Qty: 1 KIT | Refills: 0 | Status: SHIPPED | OUTPATIENT
Start: 2021-04-05 | End: 2022-03-31

## 2021-04-05 RX ORDER — LIDOCAINE HYDROCHLORIDE 10 MG/ML
1 INJECTION, SOLUTION EPIDURAL; INFILTRATION; INTRACAUDAL; PERINEURAL
Status: DISCONTINUED | OUTPATIENT
Start: 2021-04-05 | End: 2021-04-05 | Stop reason: HOSPADM

## 2021-04-05 RX ORDER — DEXAMETHASONE SODIUM PHOSPHATE 4 MG/ML
12 INJECTION, SOLUTION INTRA-ARTICULAR; INTRALESIONAL; INTRAMUSCULAR; INTRAVENOUS; SOFT TISSUE ONCE
Status: DISCONTINUED | OUTPATIENT
Start: 2021-04-05 | End: 2021-04-05 | Stop reason: HOSPADM

## 2021-04-05 RX ORDER — PROMETHAZINE HYDROCHLORIDE 25 MG/1
12.5 TABLET ORAL EVERY 6 HOURS PRN
Status: DISCONTINUED | OUTPATIENT
Start: 2021-04-05 | End: 2021-04-05 | Stop reason: HOSPADM

## 2021-04-05 RX ORDER — GLYCOPYRROLATE 1 MG/5 ML
SYRINGE (ML) INTRAVENOUS PRN
Status: DISCONTINUED | OUTPATIENT
Start: 2021-04-05 | End: 2021-04-05 | Stop reason: SDUPTHER

## 2021-04-05 RX ORDER — HYDRALAZINE HYDROCHLORIDE 20 MG/ML
5 INJECTION INTRAMUSCULAR; INTRAVENOUS EVERY 10 MIN PRN
Status: DISCONTINUED | OUTPATIENT
Start: 2021-04-05 | End: 2021-04-05 | Stop reason: HOSPADM

## 2021-04-05 RX ORDER — SODIUM CHLORIDE 0.9 % (FLUSH) 0.9 %
10 SYRINGE (ML) INJECTION PRN
Status: DISCONTINUED | OUTPATIENT
Start: 2021-04-05 | End: 2021-04-05 | Stop reason: HOSPADM

## 2021-04-05 RX ORDER — ONDANSETRON 2 MG/ML
4 INJECTION INTRAMUSCULAR; INTRAVENOUS
Status: DISCONTINUED | OUTPATIENT
Start: 2021-04-05 | End: 2021-04-05 | Stop reason: HOSPADM

## 2021-04-05 RX ORDER — SUCCINYLCHOLINE/SOD CL,ISO/PF 200MG/10ML
SYRINGE (ML) INTRAVENOUS PRN
Status: DISCONTINUED | OUTPATIENT
Start: 2021-04-05 | End: 2021-04-05 | Stop reason: SDUPTHER

## 2021-04-05 RX ORDER — LABETALOL HYDROCHLORIDE 5 MG/ML
5 INJECTION, SOLUTION INTRAVENOUS EVERY 10 MIN PRN
Status: DISCONTINUED | OUTPATIENT
Start: 2021-04-05 | End: 2021-04-05 | Stop reason: HOSPADM

## 2021-04-05 RX ORDER — ONDANSETRON 2 MG/ML
INJECTION INTRAMUSCULAR; INTRAVENOUS PRN
Status: DISCONTINUED | OUTPATIENT
Start: 2021-04-05 | End: 2021-04-05 | Stop reason: SDUPTHER

## 2021-04-05 RX ORDER — LIDOCAINE HYDROCHLORIDE 20 MG/ML
INJECTION, SOLUTION EPIDURAL; INFILTRATION; INTRACAUDAL; PERINEURAL PRN
Status: DISCONTINUED | OUTPATIENT
Start: 2021-04-05 | End: 2021-04-05 | Stop reason: SDUPTHER

## 2021-04-05 RX ORDER — ACETAMINOPHEN AND CODEINE PHOSPHATE 300; 30 MG/1; MG/1
1 TABLET ORAL EVERY 4 HOURS PRN
Qty: 20 TABLET | Refills: 0 | Status: SHIPPED | OUTPATIENT
Start: 2021-04-05 | End: 2021-04-12

## 2021-04-05 RX ORDER — FENTANYL CITRATE 50 UG/ML
INJECTION, SOLUTION INTRAMUSCULAR; INTRAVENOUS PRN
Status: DISCONTINUED | OUTPATIENT
Start: 2021-04-05 | End: 2021-04-05 | Stop reason: SDUPTHER

## 2021-04-05 RX ORDER — PROCHLORPERAZINE EDISYLATE 5 MG/ML
5 INJECTION INTRAMUSCULAR; INTRAVENOUS
Status: DISCONTINUED | OUTPATIENT
Start: 2021-04-05 | End: 2021-04-05 | Stop reason: HOSPADM

## 2021-04-05 RX ORDER — SODIUM CHLORIDE 0.9 % (FLUSH) 0.9 %
10 SYRINGE (ML) INJECTION PRN
Status: DISCONTINUED | OUTPATIENT
Start: 2021-04-05 | End: 2021-04-05 | Stop reason: SDUPTHER

## 2021-04-05 RX ORDER — FENTANYL CITRATE 50 UG/ML
25 INJECTION, SOLUTION INTRAMUSCULAR; INTRAVENOUS EVERY 5 MIN PRN
Status: DISCONTINUED | OUTPATIENT
Start: 2021-04-05 | End: 2021-04-05 | Stop reason: HOSPADM

## 2021-04-05 RX ORDER — MIDAZOLAM HYDROCHLORIDE 1 MG/ML
INJECTION INTRAMUSCULAR; INTRAVENOUS PRN
Status: DISCONTINUED | OUTPATIENT
Start: 2021-04-05 | End: 2021-04-05 | Stop reason: SDUPTHER

## 2021-04-05 RX ORDER — DEXAMETHASONE SODIUM PHOSPHATE 4 MG/ML
INJECTION, SOLUTION INTRA-ARTICULAR; INTRALESIONAL; INTRAMUSCULAR; INTRAVENOUS; SOFT TISSUE PRN
Status: DISCONTINUED | OUTPATIENT
Start: 2021-04-05 | End: 2021-04-05 | Stop reason: SDUPTHER

## 2021-04-05 RX ORDER — ALBUTEROL SULFATE 90 UG/1
AEROSOL, METERED RESPIRATORY (INHALATION) PRN
Status: DISCONTINUED | OUTPATIENT
Start: 2021-04-05 | End: 2021-04-05 | Stop reason: SDUPTHER

## 2021-04-05 RX ORDER — BACITRACIN ZINC AND POLYMYXIN B SULFATE 500; 1000 [USP'U]/G; [USP'U]/G
OINTMENT TOPICAL
Status: COMPLETED | OUTPATIENT
Start: 2021-04-05 | End: 2021-04-05

## 2021-04-05 RX ADMIN — SODIUM CHLORIDE: 9 INJECTION, SOLUTION INTRAVENOUS at 08:20

## 2021-04-05 RX ADMIN — PROPOFOL 120 MG: 10 INJECTION, EMULSION INTRAVENOUS at 09:12

## 2021-04-05 RX ADMIN — Medication 0.1 MG: at 09:19

## 2021-04-05 RX ADMIN — ALBUTEROL SULFATE 4 PUFF: 90 AEROSOL, METERED RESPIRATORY (INHALATION) at 09:16

## 2021-04-05 RX ADMIN — DEXAMETHASONE SODIUM PHOSPHATE 4 MG: 4 INJECTION, SOLUTION INTRAMUSCULAR; INTRAVENOUS at 09:16

## 2021-04-05 RX ADMIN — Medication 100 MG: at 09:13

## 2021-04-05 RX ADMIN — FENTANYL CITRATE 25 MCG: 50 INJECTION, SOLUTION INTRAMUSCULAR; INTRAVENOUS at 09:12

## 2021-04-05 RX ADMIN — ONDANSETRON 4 MG: 2 INJECTION INTRAMUSCULAR; INTRAVENOUS at 09:16

## 2021-04-05 RX ADMIN — MIDAZOLAM 1 MG: 1 INJECTION INTRAMUSCULAR; INTRAVENOUS at 09:10

## 2021-04-05 RX ADMIN — ROCURONIUM BROMIDE 10 MG: 10 INJECTION, SOLUTION INTRAVENOUS at 09:12

## 2021-04-05 RX ADMIN — LIDOCAINE HYDROCHLORIDE 60 MG: 20 INJECTION, SOLUTION EPIDURAL; INFILTRATION; INTRACAUDAL; PERINEURAL at 09:12

## 2021-04-05 ASSESSMENT — PULMONARY FUNCTION TESTS
PIF_VALUE: 18
PIF_VALUE: 24
PIF_VALUE: 26
PIF_VALUE: 2
PIF_VALUE: 2
PIF_VALUE: 24
PIF_VALUE: 27
PIF_VALUE: 1
PIF_VALUE: 19
PIF_VALUE: 17
PIF_VALUE: 1
PIF_VALUE: 24
PIF_VALUE: 23
PIF_VALUE: 16
PIF_VALUE: 1

## 2021-04-05 ASSESSMENT — ENCOUNTER SYMPTOMS: SHORTNESS OF BREATH: 0

## 2021-04-05 ASSESSMENT — PAIN - FUNCTIONAL ASSESSMENT: PAIN_FUNCTIONAL_ASSESSMENT: 0-10

## 2021-04-05 NOTE — OP NOTE
[facility]                     [address] [facility], [address]                                OPERATIVE REPORT    PATIENT NAME: LARS PEARCE                       :        1960  MED REC NO:   [id number]                          ROOM:  ACCOUNT NO:   [de-identified]                           ADMIT DATE: 2021  PROVIDER:     Farrukh Damian. Gómez Woods MD    DATE OF PROCEDURE:  2021    PREOPERATIVE DIAGNOSIS:  Recurrent epistaxis, right side of nose. POSTOPERATIVE DIAGNOSIS:  Recurrent epistaxis, right side of nose. OPERATION PERFORMED:  Cauterization of nasal septum, as well as middle  turbinate. SURGEON:  Farrukh Damian. Gómez Woods MD    ANESTHESIA:  General with endotracheal tube. OPERATIVE PROCEDURE: The adequately premedicated patient was brought to  the operating room and placed in the supine position. Anesthesia was  induced to the satisfactory level with the endotracheal tube in  position. The patient was draped in the usual fashion. The patient has  a history of recurrent epistaxis on the right side of the nose,  unresponsive to numerous medical therapies. As a result, it was  determined that cauterization should be next attempted. The nose was  treated with Afrin pledgets for an appropriate interval, which was then  removed. Bleeding sites appeared to be the anterior portion of the  caudal septum, as well as the middle turbinate area. All of these areas  were treated with electrocoagulation. Bleeding was minimal and measured  approximately 5 ml. A Gelfoam strip was treated with a Polysporin  ointment and placed within the right nares to facilitate proper healing. The patient tolerated the procedure well and sent to the recovery room  in good condition.         Mik Ny MD    D: 2021 9:40:01       T: 2021 9:44:40     [email or web address]  Job#: [id number]     Doc#: [id number]    CC:

## 2021-04-05 NOTE — PROGRESS NOTES
Pt dc'd per wheelchair. Patient discharged home with belongings. Toby Arizmendi taking stable pt home.

## 2021-04-05 NOTE — ANESTHESIA POSTPROCEDURE EVALUATION
Department of Anesthesiology  Postprocedure Note    Patient: Phill White  MRN: [id number]  YOB: 1960  Date of evaluation: 4/5/2021  Time:  10:16 AM     Procedure Summary     Date: 04/05/21 Room / Location: [address]    Anesthesia Start: 6104 Anesthesia Stop: 6773    Procedure: CAUTERIZATION RIGHT NASAL SEPTUM (Right Nose) Diagnosis: (R04.0  ACUTE ANTERIOR EPISTAXIS)    Surgeons: Raina Eid MD Responsible Provider: Harish Del Rio MD    Anesthesia Type: general ASA Status: 3          Anesthesia Type: general    Kaitlynn Phase I: Kaitlynn Score: 10    Kaitlynn Phase II:      Last vitals: Reviewed and per EMR flowsheets.        Anesthesia Post Evaluation    Patient location during evaluation: PACU  Patient participation: complete - patient participated  Level of consciousness: awake and alert  Airway patency: patent  Nausea & Vomiting: no nausea and no vomiting  Complications: no  Cardiovascular status: hemodynamically stable  Respiratory status: acceptable  Hydration status: stable

## 2021-04-05 NOTE — PROGRESS NOTES
CLINICAL PHARMACY NOTE: MEDS TO [address] Select Patient?: No  Total # of Prescriptions Filled: 2   The following medications were delivered to the patient:  · Tylenol 3  · Medrol dose pack 4 mg  Total # of Interventions Completed: 0  Time Spent (min): 15    Additional Documentation:    Delivered to Nurse Levy Lainez hT

## 2021-04-05 NOTE — ANESTHESIA PRE PROCEDURE
Department of Anesthesiology  Preprocedure Note       Name:  Brody Heck   Age:  61 y.o.  :  1960                                          MRN:  [id number]         Date:  2021      Surgeon: Abby Romero):  Christopher Alegria MD    Procedure: Procedure(s):  CAUTERIZATION RIGHT NASAL SEPTUM    Medications prior to admission:   Prior to Admission medications    Medication Sig Start Date End Date Taking? Authorizing Provider   Dulaglutide (TRULICITY) 1.5 AA/4.5FZ SOPN Inject into the skin every 7 days   Yes Historical Provider, MD   losartan (COZAAR) 25 MG tablet Take 25 mg by mouth daily 20 Yes Historical Provider, MD   rosuvastatin (CRESTOR) 20 MG tablet Take 20 mg by mouth daily 7/15/20 7/15/21 Yes Historical Provider, MD   oxybutynin (DITROPAN) 5 MG tablet Take 5 mg by mouth 3 times daily 20  Yes Historical Provider, MD   metoprolol succinate (TOPROL XL) 50 MG extended release tablet Take 50 mg by mouth daily   Yes Historical Provider, MD   amLODIPine (NORVASC) 5 MG tablet Take 5 mg by mouth daily   Yes Historical Provider, MD   LEVEMIR FLEXTOUCH 100 UNIT/ML injection pen 28 Units nightly  16  Yes Historical Provider, MD   Vitamin D (CHOLECALCIFEROL) 1000 UNITS CAPS capsule Take 5,000 Units by mouth every 7 days. Saturday   Yes Historical Provider, MD   azithromycin (ZITHROMAX) 250 MG tablet Dispense one 5 day supply pack and take as directed 20   Christopher Alegria MD   azithromycin (ZITHROMAX) 250 MG tablet Dispense one 5 day supply pack and take as directed 11/3/20   Christopher Alegria MD   rivaroxaban (XARELTO) 15 MG TABS tablet Take 1 tablet by mouth 2 times daily (with meals) for 21 days.  6/10/14 12/2/20  ERIC Bobo - CNP       Current medications:    Current Facility-Administered Medications   Medication Dose Route Frequency Provider Last Rate Last Admin    0.9 % sodium chloride infusion   Intravenous Continuous Christopher Alegria MD Allergies: Allergies   Allergen Reactions    Metformin Hcl      Other reaction(s): liver damage    Lisinopril Swelling    Glipizide Other (See Comments) and Rash     Blisters on mouth  Blisters on mouth         Problem List:    Patient Active Problem List   Diagnosis Code    Renal vein thrombosis (HCC) I82.3    DM II (diabetes mellitus, type II), controlled ([facility] E11.9    Enteritis K52.9    Paraesophageal hiatal hernia K44.9       Past Medical History:        Diagnosis Date    Cancer ([facility]     skin, melanoma    Diabetes mellitus ([facility]     DVT (deep venous thrombosis) (HCC)     right leg    Hyperlipidemia     Hypertension     PE (pulmonary thromboembolism) ([facility]        Past Surgical History:        Procedure Laterality Date    HYSTERECTOMY      NOSE SURGERY      OVARY REMOVAL         Social History:    Social History     Tobacco Use    Smoking status: Never Smoker    Smokeless tobacco: Never Used   Substance Use Topics    Alcohol use: No                                Counseling given: Not Answered      Vital Signs (Current):   Vitals:    03/31/21 0948 04/05/21 0738   Weight: 143 lb (64.9 kg) 140 lb (63.5 kg)   Height: 5' 5\" (1.651 m) 5' 5\" (1.651 m)                                              BP Readings from Last 3 Encounters:   03/12/21 (!) 191/90   12/11/20 134/85   12/04/20 (!) 156/92       NPO Status: Time of last liquid consumption: 0500                        Time of last solid consumption: 2100                        Date of last liquid consumption: 04/05/21                        Date of last solid food consumption: 04/04/21    BMI:   Wt Readings from Last 3 Encounters:   04/05/21 140 lb (63.5 kg)   03/12/21 139 lb (63 kg)   12/04/20 139 lb (63 kg)     Body mass index is 23.3 kg/m².     CBC:   Lab Results   Component Value Date    WBC 9.4 04/13/2017    RBC 4.08 04/13/2017    HGB 12.3 04/13/2017    HCT 38.2 04/13/2017    MCV 93.7 04/13/2017    RDW 13.2 04/13/2017     04/13/2017 CMP:   Lab Results   Component Value Date     04/13/2017    K 4.6 04/13/2017     04/13/2017    CO2 22 04/13/2017    BUN 11 04/13/2017    CREATININE 0.8 04/13/2017    GFRAA >60 04/13/2017    LABGLOM >60 04/13/2017    GLUCOSE 214 04/13/2017    PROT 7.6 02/21/2016    CALCIUM 7.8 04/13/2017    BILITOT <0.2 02/21/2016    ALKPHOS 104 02/21/2016    AST 17 02/21/2016    ALT 33 02/21/2016       POC Tests: No results for input(s): POCGLU, POCNA, POCK, POCCL, POCBUN, POCHEMO, POCHCT in the last 72 hours. Coags:   Lab Results   Component Value Date    PROTIME 10.7 06/07/2014    INR 0.95 06/07/2014    APTT 43.9 06/10/2014       HCG (If Applicable): No results found for: PREGTESTUR, PREGSERUM, HCG, HCGQUANT     ABGs: No results found for: PHART, PO2ART, BTW3KUQ, JUL2GJW, BEART, T9FZSWXO     Type & Screen (If Applicable):  No results found for: LABABO, LABRH    Drug/Infectious Status (If Applicable):  No results found for: HIV, HEPCAB    COVID-19 Screening (If Applicable):   Lab Results   Component Value Date    COVID19 Not Detected 04/01/2021           Anesthesia Evaluation  Patient summary reviewed and Nursing notes reviewed no history of anesthetic complications:   Airway: Mallampati: I  TM distance: >3 FB   Neck ROM: full  Mouth opening: > = 3 FB Dental:    (+) edentulous      Pulmonary:   (+) rhonchi,      (-) asthma and shortness of breath                           Cardiovascular:    (+) hypertension:, hyperlipidemia    (-)  angina                Neuro/Psych:   (+) psychiatric history:   (-) CVA           GI/Hepatic/Renal:   (+) hiatal hernia,      (-) GERD and liver disease       Endo/Other:    (+) DiabetesType II DM, , malignancy/cancer. (-) hypothyroidism               Abdominal:           Vascular:   + DVT, PE.  - PVD. Anesthesia Plan      general     ASA 3       Induction: intravenous. MIPS: Postoperative opioids intended and Prophylactic antiemetics administered. Anesthetic plan and risks discussed with patient. Use of blood products discussed with patient whom. Plan discussed with CRNA.                   Henri Marcano MD   4/5/2021

## 2021-04-07 ENCOUNTER — OFFICE VISIT (OUTPATIENT)
Dept: ENT CLINIC | Age: 61
End: 2021-04-07

## 2021-04-07 VITALS — TEMPERATURE: 96.9 F

## 2021-04-07 DIAGNOSIS — R04.0 EPISTAXIS, RECURRENT: ICD-10-CM

## 2021-04-07 DIAGNOSIS — H90.5 SENSORINEURAL HEARING LOSS (SNHL) OF LEFT EAR, UNSPECIFIED HEARING STATUS ON CONTRALATERAL SIDE: Primary | ICD-10-CM

## 2021-04-07 PROCEDURE — 99024 POSTOP FOLLOW-UP VISIT: CPT | Performed by: OTOLARYNGOLOGY

## 2021-04-07 NOTE — PROGRESS NOTES
Patient seems to be doing well following electrocoagulation of the inferior turbinate on the right side and middle turbinate on the right along with a anterior portion of the cartilaginous septum for epistaxis. No bleeding is been occurring. Healing looks appropriate. I have suggested to her continuation of saline spray and I will see her again in 1 week. She does feel that her hearing has diminished to the point where she is likely needing hearing aids and I have suggested the audiologist to see her.

## 2021-04-14 ENCOUNTER — OFFICE VISIT (OUTPATIENT)
Dept: ENT CLINIC | Age: 61
End: 2021-04-14

## 2021-04-14 DIAGNOSIS — R04.0 EPISTAXIS, RECURRENT: Primary | ICD-10-CM

## 2021-04-14 PROCEDURE — 99024 POSTOP FOLLOW-UP VISIT: CPT | Performed by: OTOLARYNGOLOGY

## 2021-04-14 NOTE — PROGRESS NOTES
Patient continues to have drainage from the right side of her nose but no obvious bleeding. Evaluation continues to show healing but no evidence of any bleeding or other manifestations of a problem. She will continue current treatment and I will see her again in 2 weeks.

## 2021-05-11 ENCOUNTER — OFFICE VISIT (OUTPATIENT)
Dept: ENT CLINIC | Age: 61
End: 2021-05-11
Payer: MEDICARE

## 2021-05-11 VITALS — HEART RATE: 66 BPM | DIASTOLIC BLOOD PRESSURE: 78 MMHG | SYSTOLIC BLOOD PRESSURE: 122 MMHG | TEMPERATURE: 97.5 F

## 2021-05-11 DIAGNOSIS — R04.0 EPISTAXIS: Primary | ICD-10-CM

## 2021-05-11 PROCEDURE — 30901 CONTROL OF NOSEBLEED: CPT | Performed by: OTOLARYNGOLOGY

## 2021-05-11 NOTE — PROGRESS NOTES
Patient has been doing well in regards to recovery from the procedure on the right side of her nose. However in the last few days, she has been having some epistaxis on the left side. She has had no recent trauma or infection. Currently, she appears in no obvious acute distress. Ear examination continues to reveal normal findings. Oral examination is unremarkable. The neck is free of adenopathy, mass, thyroid enlargement. Nasal mucosa treated with cotton pledgets  impregnated with Afrin and lidocaine placed in middle meatuses against turbinates. Pledgets left in place for five minutes and removed enabling enhanced visualization. The exam revealed positive edema of mucosa. it was negative for erythema indicative of infection. There was not evidence of deviation of the septum which was not significant. There were not polyps present. .There were not other masses present. The turbinates were not enlarged beyond normal.  The right side seems to be healing well with lesser amount of crusting. On the left side, the bleeding site appears to be the anterior portion of the cartilaginous septum. This was treated with silver nitrate cautery. She will now use the Bactroban ointment 3 times daily on the left side for the next 2 weeks and she will return to the office in 2 weeks.

## 2021-06-02 ENCOUNTER — OFFICE VISIT (OUTPATIENT)
Dept: ENT CLINIC | Age: 61
End: 2021-06-02
Payer: MEDICARE

## 2021-06-02 VITALS — SYSTOLIC BLOOD PRESSURE: 149 MMHG | TEMPERATURE: 97.1 F | HEART RATE: 73 BPM | DIASTOLIC BLOOD PRESSURE: 86 MMHG

## 2021-06-02 DIAGNOSIS — R04.0 EPISTAXIS, RECURRENT: Primary | ICD-10-CM

## 2021-06-02 PROCEDURE — 3017F COLORECTAL CA SCREEN DOC REV: CPT | Performed by: OTOLARYNGOLOGY

## 2021-06-02 PROCEDURE — 30901 CONTROL OF NOSEBLEED: CPT | Performed by: OTOLARYNGOLOGY

## 2021-06-02 PROCEDURE — G8420 CALC BMI NORM PARAMETERS: HCPCS | Performed by: OTOLARYNGOLOGY

## 2021-06-02 PROCEDURE — 1036F TOBACCO NON-USER: CPT | Performed by: OTOLARYNGOLOGY

## 2021-06-02 PROCEDURE — G8428 CUR MEDS NOT DOCUMENT: HCPCS | Performed by: OTOLARYNGOLOGY

## 2021-06-02 NOTE — PROGRESS NOTES
Patient has been doing well on the right side of her nose following treatment but is having some bleeding yesterday morning the left side. This has not been a constant recurring problem and was stopped with pressure. Otherwise, she has been doing reasonably well. Currently, she appears in no distress. Ear examination reveals normal-appearing tympanic membranes and ear canals bilaterally. Oral examination is unremarkable. The neck is free of adenopathy, mass, thyroid enlargement. Nasal mucosa treated with cotton pledgets  impregnated with Afrin and lidocaine placed in middle meatuses against turbinates. Pledgets left in place for five minutes and removed enabling enhanced visualization. The exam revealed positive edema of mucosa. it was negative for erythema indicative of infection. There was not evidence of deviation of the septum which was not significant. There were not polyps present. .There were not other masses present. The turbinates were not enlarged beyond normal.  The right side of the nose appears to be healing well. On the left, there is a small area of the anterior portion of the cartilaginous septum that appeared to be the source. The areas treated with topical silver nitrate cautery. I have suggested that she use her Bactroban ointment 3 times a day for the next 5 days on the left side. She will contact me if persistence of problems develop.

## 2021-09-29 ENCOUNTER — APPOINTMENT (OUTPATIENT)
Dept: CT IMAGING | Age: 61
End: 2021-09-29
Payer: MEDICARE

## 2021-09-29 ENCOUNTER — HOSPITAL ENCOUNTER (EMERGENCY)
Age: 61
Discharge: HOME | End: 2021-09-30
Payer: MEDICARE

## 2021-09-29 DIAGNOSIS — R91.1 PULMONARY NODULE: ICD-10-CM

## 2021-09-29 DIAGNOSIS — R10.31 ABDOMINAL PAIN, RIGHT LOWER QUADRANT: Primary | ICD-10-CM

## 2021-09-29 DIAGNOSIS — I10 ESSENTIAL HYPERTENSION: ICD-10-CM

## 2021-09-29 LAB
A/G RATIO: 1.3 (ref 1.1–2.2)
ALBUMIN SERPL-MCNC: 4.5 G/DL (ref 3.4–5)
ALP BLD-CCNC: 90 U/L (ref 40–129)
ALT SERPL-CCNC: 28 U/L (ref 10–40)
ANION GAP SERPL CALCULATED.3IONS-SCNC: 11 MMOL/L (ref 3–16)
AST SERPL-CCNC: 28 U/L (ref 15–37)
BACTERIA: ABNORMAL /HPF
BASOPHILS ABSOLUTE: 0.1 K/UL (ref 0–0.2)
BASOPHILS RELATIVE PERCENT: 1.1 %
BILIRUB SERPL-MCNC: 0.3 MG/DL (ref 0–1)
BILIRUBIN URINE: NEGATIVE
BLOOD, URINE: NEGATIVE
BUN BLDV-MCNC: 19 MG/DL (ref 7–20)
CALCIUM SERPL-MCNC: 9.9 MG/DL (ref 8.3–10.6)
CHLORIDE BLD-SCNC: 101 MMOL/L (ref 99–110)
CLARITY: ABNORMAL
CO2: 25 MMOL/L (ref 21–32)
COLOR: YELLOW
CREAT SERPL-MCNC: 0.9 MG/DL (ref 0.6–1.2)
EOSINOPHILS ABSOLUTE: 0.7 K/UL (ref 0–0.6)
EOSINOPHILS RELATIVE PERCENT: 10.3 %
EPITHELIAL CELLS, UA: 11 /HPF (ref 0–5)
GFR AFRICAN AMERICAN: >60
GFR NON-AFRICAN AMERICAN: >60
GLOBULIN: 3.6 G/DL
GLUCOSE BLD-MCNC: 210 MG/DL (ref 70–99)
GLUCOSE URINE: NEGATIVE MG/DL
HCT VFR BLD CALC: 40.8 % (ref 36–48)
HEMOGLOBIN: 13.6 G/DL (ref 12–16)
HYALINE CASTS: 6 /LPF (ref 0–8)
KETONES, URINE: NEGATIVE MG/DL
LACTIC ACID: 1.6 MMOL/L (ref 0.4–2)
LEUKOCYTE ESTERASE, URINE: NEGATIVE
LYMPHOCYTES ABSOLUTE: 2.6 K/UL (ref 1–5.1)
LYMPHOCYTES RELATIVE PERCENT: 36.2 %
MCH RBC QN AUTO: 30.8 PG (ref 26–34)
MCHC RBC AUTO-ENTMCNC: 33.4 G/DL (ref 31–36)
MCV RBC AUTO: 92.2 FL (ref 80–100)
MICROSCOPIC EXAMINATION: YES
MONOCYTES ABSOLUTE: 0.5 K/UL (ref 0–1.3)
MONOCYTES RELATIVE PERCENT: 7.2 %
NEUTROPHILS ABSOLUTE: 3.2 K/UL (ref 1.7–7.7)
NEUTROPHILS RELATIVE PERCENT: 45.2 %
NITRITE, URINE: NEGATIVE
PDW BLD-RTO: 12.9 % (ref 12.4–15.4)
PH UA: 5.5 (ref 5–8)
PLATELET # BLD: 177 K/UL (ref 135–450)
PMV BLD AUTO: 9.3 FL (ref 5–10.5)
POTASSIUM REFLEX MAGNESIUM: 4.9 MMOL/L (ref 3.5–5.1)
PROTEIN UA: 100 MG/DL
RBC # BLD: 4.42 M/UL (ref 4–5.2)
RBC UA: 3 /HPF (ref 0–4)
SODIUM BLD-SCNC: 137 MMOL/L (ref 136–145)
SPECIFIC GRAVITY UA: 1.02 (ref 1–1.03)
TOTAL PROTEIN: 8.1 G/DL (ref 6.4–8.2)
URINE REFLEX TO CULTURE: ABNORMAL
URINE TYPE: ABNORMAL
UROBILINOGEN, URINE: 0.2 E.U./DL
WBC # BLD: 7.1 K/UL (ref 4–11)
WBC UA: 6 /HPF (ref 0–5)

## 2021-09-29 PROCEDURE — 99283 EMERGENCY DEPT VISIT LOW MDM: CPT

## 2021-09-29 PROCEDURE — 80053 COMPREHEN METABOLIC PANEL: CPT

## 2021-09-29 PROCEDURE — 83605 ASSAY OF LACTIC ACID: CPT

## 2021-09-29 PROCEDURE — 81001 URINALYSIS AUTO W/SCOPE: CPT

## 2021-09-29 PROCEDURE — 85025 COMPLETE CBC W/AUTO DIFF WBC: CPT

## 2021-09-29 PROCEDURE — 36415 COLL VENOUS BLD VENIPUNCTURE: CPT

## 2021-09-29 RX ORDER — MECLIZINE HCL 12.5 MG/1
25 TABLET ORAL DAILY
COMMUNITY

## 2021-09-29 ASSESSMENT — PAIN DESCRIPTION - PAIN TYPE: TYPE: ACUTE PAIN

## 2021-09-29 ASSESSMENT — PAIN SCALES - GENERAL: PAINLEVEL_OUTOF10: 10

## 2021-09-29 ASSESSMENT — PAIN DESCRIPTION - ORIENTATION: ORIENTATION: RIGHT

## 2021-09-29 ASSESSMENT — PAIN DESCRIPTION - LOCATION: LOCATION: GROIN

## 2021-09-30 ENCOUNTER — APPOINTMENT (OUTPATIENT)
Dept: CT IMAGING | Age: 61
End: 2021-09-30
Payer: MEDICARE

## 2021-09-30 VITALS
DIASTOLIC BLOOD PRESSURE: 122 MMHG | SYSTOLIC BLOOD PRESSURE: 172 MMHG | HEIGHT: 65 IN | BODY MASS INDEX: 23.32 KG/M2 | RESPIRATION RATE: 18 BRPM | OXYGEN SATURATION: 98 % | WEIGHT: 140 LBS | HEART RATE: 98 BPM | TEMPERATURE: 98.3 F

## 2021-09-30 PROCEDURE — 6360000004 HC RX CONTRAST MEDICATION: Performed by: NURSE PRACTITIONER

## 2021-09-30 PROCEDURE — 74177 CT ABD & PELVIS W/CONTRAST: CPT

## 2021-09-30 RX ADMIN — IOPAMIDOL 75 ML: 755 INJECTION, SOLUTION INTRAVENOUS at 00:25

## 2021-09-30 NOTE — ED PROVIDER NOTES
[facility]        Pt Name: Donna Sanon  MRN: [id number]  [place] 1960  Date of evaluation: 9/29/2021  Provider: ERIC Srivastava CNP  PCP: Star LOPEZ  Note Started: 11:19 PM EDT       YASMINE. I have evaluated this patient. My supervising physician was available for consultation. CHIEF COMPLAINT       Chief Complaint   Patient presents with    Groin Pain     Pt. states pain in the R groin area since this am       HISTORY OF PRESENT ILLNESS   (Location, Timing/Onset, Context/Setting, Quality, Duration, Modifying Factors, Severity, Associated Signs and Symptoms)  Note limiting factors. Chief Complaint: Right lower quadrant pain    Donna Sanon is a 61 y.o. female with medical history of skin cancer, DM, DVT right lower extremity, PE, HTN, HLD, hysterectomy presents emergency department with complaints of right lower quadrant abdominal pain present since this morning. Pain has since subsided while in the emergency department. She does note that she initially went to [facility] and waited for 4 hours and left prior to any testing was done. In the emergency department she initially was refusing to have any lab work or imaging completed as she is now pain-free. She is reluctant, then does agree to have testing completed. She denies any prior surgeries to her abdomen other than a hysterectomy. She denies having a recent EGD or colonoscopy. She denies any associated urinary symptoms, hematuria, vaginal bleeding or discharge, nausea, vomiting, diarrhea, headache, neck pain, back pain, lightheaded and dizzy, syncope, rashes, or fevers. Patient states that it is not possible for her to have appendicitis as \"I do not have any teeth and therefore I cannot bite my fingernails. \"  She has completed her COVID-19 vaccine series of Kezia Mckeon in March.   She denies any recent trauma, accidents, injuries, or falls.    Nursing Notes were all reviewed and agreed with or any disagreements were addressed in the HPI. REVIEW OF SYSTEMS    (2-9 systems for level 4, 10 or more for level 5)     Review of Systems    Positives and Pertinent negatives as per HPI. Except as noted above in the ROS, all other systems were reviewed and negative. PAST MEDICAL HISTORY     Past Medical History:   Diagnosis Date    Cancer ([facility]     skin, melanoma    [facility]     DVT (deep venous thrombosis) (HCC)     right leg    Hyperlipidemia     Hypertension     PE [facility]embolism[facility]          SURGICAL HISTORY     Past Surgical History:   Procedure Laterality Date    HYSTERECTOMY      NOSE SURGERY      NOSE SURGERY Right 4/5/2021    CAUTERIZATION RIGHT NASAL SEPTUM performed by Girma Stauffer MD at [address] [place] [address]       Previous Medications    AMLODIPINE (NORVASC) 5 MG TABLET    Take 5 mg by mouth daily    AZITHROMYCIN (ZITHROMAX) 250 MG TABLET    Dispense one 5 day supply pack and take as directed    AZITHROMYCIN (ZITHROMAX) 250 MG TABLET    Dispense one 5 day supply pack and take as directed    DULAGLUTIDE (TRULICITY) 1.5 NK/4.5SF SOPN    Inject into the skin every 7 days    LEVEMIR FLEXTOUCH 100 UNIT/ML INJECTION PEN    28 Units nightly     LOSARTAN (COZAAR) 25 MG TABLET    Take 25 mg by mouth daily    MECLIZINE (ANTIVERT) 12.5 MG TABLET    Take 25 mg by mouth daily    METHYLPREDNISOLONE (MEDROL, GAURAV,) 4 MG TABLET    Take 1 tablet by mouth See Admin Instructions Take by mouth. METOPROLOL SUCCINATE (TOPROL XL) 50 MG EXTENDED RELEASE TABLET    Take 50 mg by mouth daily    OXYBUTYNIN (DITROPAN) 5 MG TABLET    Take 5 mg by mouth 3 times daily    RIVAROXABAN (XARELTO) 15 MG TABS TABLET    Take 1 tablet by mouth 2 times daily (with meals) for 21 days.     ROSUVASTATIN (CRESTOR) 20 MG TABLET    Take 20 mg by mouth daily    VITAMIN D (CHOLECALCIFEROL) 1000 UNITS CAPS CAPSULE    Take 5,000 Units by mouth every 7 days. Saturday         ALLERGIES     Metformin hcl, Lisinopril, and Glipizide    FAMILYHISTORY     History reviewed. No pertinent family history. SOCIAL HISTORY       Social History     Tobacco Use    Smoking status: Never Smoker    Smokeless tobacco: Never Used   Substance Use Topics    Alcohol use: No    Drug use: No       SCREENINGS             PHYSICAL EXAM    (up to 7 for level 4, 8 or more for level 5)     ED Triage Vitals [09/29/21 2156]   BP Temp Temp Source Pulse Resp SpO2 Height Weight   (!) 118/117 98.3 °F (36.8 °C) Oral 98 18 98 % 5' 5\" (1.651 m) 140 lb (63.5 kg)       Physical Exam  Vitals and nursing note reviewed. Constitutional:       General: She is awake. Appearance: Normal appearance. She is well-developed and normal weight. HENT:      Head: Normocephalic and atraumatic. Nose: Nose normal.   Eyes:      General:         Right eye: No discharge. Left eye: No discharge. Cardiovascular:      Rate and Rhythm: Normal rate and regular rhythm. Heart sounds: Normal heart sounds. Pulmonary:      Effort: Pulmonary effort is normal. No respiratory distress. Breath sounds: Normal breath sounds. Abdominal:      General: Bowel sounds are normal.      Palpations: Abdomen is soft. Tenderness: There is no abdominal tenderness. There is no right CVA tenderness or left CVA tenderness. Musculoskeletal:         General: Normal range of motion. Cervical back: Normal range of motion. Right lower leg: No edema. Left lower leg: No edema. Skin:     General: Skin is warm and dry. Coloration: Skin is not pale. Neurological:      Mental Status: She is alert and oriented to person, place, and time. Psychiatric:         Behavior: Behavior normal. Behavior is cooperative.          DIAGNOSTIC RESULTS   LABS:    Labs Reviewed   CBC WITH AUTO DIFFERENTIAL - Abnormal; Notable for the following components: Result Value    Eosinophils Absolute 0.7 (*)     All other components within normal limits    Narrative:     Performed at:  [facility] [address] [facility]   Phone [phone]   COMPREHENSIVE METABOLIC PANEL W/ REFLEX TO MG FOR LOW K - Abnormal; Notable for the following components:    Glucose 210 (*)     All other components within normal limits    Narrative:     Performed at:  [facility] [address] [facility]   Phone [phone]   URINE RT REFLEX TO CULTURE - Abnormal; Notable for the following components:    Clarity, UA CLOUDY (*)     Protein,  (*)     All other components within normal limits    Narrative:     Performed at:  [facility] [address] [facility]   Phone [phone]   MICROSCOPIC URINALYSIS - Abnormal; Notable for the following components:    Bacteria, UA 1+ (*)     WBC, UA 6 (*)     Epithelial Cells, UA 11 (*)     All other components within normal limits    Narrative:     Performed at:  [facility] [address] [facility]   Phone [phone]   LACTIC ACID, PLASMA    Narrative:     Performed at:  [facility] [address] [facility]   Phone [phone]       When ordered only abnormal lab results are displayed. All other labs were within normal range or not returned as of this dictation. EKG: When ordered, EKG's are interpreted by the Emergency Department Physician in the absence of a cardiologist.  Please see their note for interpretation of EKG.     RADIOLOGY:   Non-plain film images such as CT, Ultrasound and MRI are read by the radiologist. Plain radiographic images are visualized and preliminarily interpreted by the ED Provider with the below findings:        Interpretation per the Radiologist below, if available at the time of this note:    CT ABDOMEN PELVIS W IV CONTRAST Additional Contrast? None   Final Result   No acute finding in the abdomen or pelvis. Specifically, the appendix is   normal.  No evidence of an inguinal hernia. 7 mm nodule in the lingula adjacent to the left heart border. Follow-up   recommendation as below. RECOMMENDATIONS:   Guidelines for follow-up and management of pulmonary nodules found on abdomen   CT:      <6 mm - No follow up recommend on the basis of the estimated low risk of   malignancy. 6-8-mm - recommend follow-up chest CT after an appropriate interval (3-12   months depending on clinical risk). Radiology 2017 http://pubs. rsna.org/doi/full/10.1148/radiol. [email or web address]           No results found. PROCEDURES   Unless otherwise noted below, none     Procedures    CRITICAL CARE TIME   N/A    CONSULTS:  None      EMERGENCY DEPARTMENT COURSE and DIFFERENTIAL DIAGNOSIS/MDM:   Vitals:    Vitals:    09/29/21 2156   BP: (!) 118/117   Pulse: 98   Resp: 18   Temp: 98.3 °F (36.8 °C)   TempSrc: Oral   SpO2: 98%   Weight: 140 lb (63.5 kg)   Height: 5' 5\" (1.651 m)       Patient was given the following medications:  Medications   iopamidol (ISOVUE-370) 76 % injection 75 mL (75 mLs IntraVENous Given 9/30/21 0025)           Care of this patient took place during the COVID-19 pandemic emergency. ED COURSE & MEDICAL DECISION MAKING    - The patient presented to the ER with complaints of RLQ pain. Vital signs were reviewed. Exam well-developed, well-nourished female who appears uncomfortable. Peripheral IV placed. Labs, Imaging ordered. - Pertinent Labs & Imaging studies reviewed. (See chart for details)   -  Patient seen and evaluated in the emergency department. -  Triage and nursing notes reviewed and incorporated. -  Old chart records reviewed and incorporated. -  YASMINE. I have evaluated this patient.   My supervising physician was available for consultation.  -  Differential diagnosis includes: pelvic inflammatory disease, TOA, ovarian torsion, kidney stone, pyelonephritis, UTI, BV/vaginitis, cervicitis, ovarian cysts, round ligament pain, pregnancy (including ectopic), appendicitis, bowel obstruction, diverticulitis, hernia, gastroenteritis, colitis vs COVID-19  -  Work-up included:  See above  -  ED treatment included:    -  Results discussed with patient. Gabriela Alcala is a 66-year-old female with complaints of right lower quadrant abdominal pain since this morning. Pain had remained constant until prior to my assessment whenever it is subsided. She is currently pain-free. She denies any aggravating or alleviating factors. Denies any history of similar symptoms. Denies any prior abdominal surgeries. Her exam is unremarkable. Lab work and imaging is obtained. CBC with absolute eos 0.7. CMP with glucose 210. Lactic acid 1.6. UA with cloudy clarity, protein 100, bacteria 1+, WBC 6 with epithelial cells 11. The abdomen pelvis shows no acute findings in the abdomen or pelvis. Specifically the appendix is normal.  No evidence of inguinal hernia. 7 mm nodule in the lingula adjacent to the left heart border. Follow-up recommended as below. Patient was informed on these results and need to follow-up with PCP for additional outpatient testing and treatment as needed. Patient has agreeable with the plan of care. Strict return discharge instructions were given. Shared decision making is completed patient stable for discharge at this time. FINAL IMPRESSION      1. Abdominal pain, right lower quadrant    2.  Pulmonary nodule          DISPOSITION/PLAN   DISPOSITION        PATIENT REFERRED TO:  Nia Ulloa  [address] [place] [address] [address] [place] [id number]    Call in 2 days  As needed, If symptoms worsen    [facility] Emergency Department  [address] [place] [address]  [phone]  Go to   As needed      DISCHARGE MEDICATIONS:  New Prescriptions    No medications on file       DISCONTINUED MEDICATIONS:  Discontinued Medications    No medications on file              (Please note that portions of this note were completed with a voice recognition program.  Efforts were made to edit the dictations but occasionally words are mis-transcribed.)    Shreya PachecorERIC CNP (electronically signed)            Shreya PachecorERIC CNP  09/30/21 [facility], APRN - CNP  09/30/21 0137

## 2022-03-31 ENCOUNTER — OFFICE VISIT (OUTPATIENT)
Dept: ENT CLINIC | Age: 62
End: 2022-03-31
Payer: MEDICARE

## 2022-03-31 VITALS — DIASTOLIC BLOOD PRESSURE: 82 MMHG | BODY MASS INDEX: 24.13 KG/M2 | WEIGHT: 145 LBS | SYSTOLIC BLOOD PRESSURE: 143 MMHG

## 2022-03-31 DIAGNOSIS — Z79.01 ANTICOAGULATED BY ANTICOAGULATION TREATMENT: ICD-10-CM

## 2022-03-31 DIAGNOSIS — R04.0 EPISTAXIS: Primary | ICD-10-CM

## 2022-03-31 DIAGNOSIS — J34.89 NASAL MUCOSA DRY: ICD-10-CM

## 2022-03-31 PROCEDURE — G8427 DOCREV CUR MEDS BY ELIG CLIN: HCPCS | Performed by: STUDENT IN AN ORGANIZED HEALTH CARE EDUCATION/TRAINING PROGRAM

## 2022-03-31 PROCEDURE — G8420 CALC BMI NORM PARAMETERS: HCPCS | Performed by: STUDENT IN AN ORGANIZED HEALTH CARE EDUCATION/TRAINING PROGRAM

## 2022-03-31 PROCEDURE — 1036F TOBACCO NON-USER: CPT | Performed by: STUDENT IN AN ORGANIZED HEALTH CARE EDUCATION/TRAINING PROGRAM

## 2022-03-31 PROCEDURE — G8484 FLU IMMUNIZE NO ADMIN: HCPCS | Performed by: STUDENT IN AN ORGANIZED HEALTH CARE EDUCATION/TRAINING PROGRAM

## 2022-03-31 PROCEDURE — 99213 OFFICE O/P EST LOW 20 MIN: CPT | Performed by: STUDENT IN AN ORGANIZED HEALTH CARE EDUCATION/TRAINING PROGRAM

## 2022-03-31 PROCEDURE — 3017F COLORECTAL CA SCREEN DOC REV: CPT | Performed by: STUDENT IN AN ORGANIZED HEALTH CARE EDUCATION/TRAINING PROGRAM

## 2022-03-31 RX ORDER — BACITRACIN ZINC AND POLYMYXIN B SULFATE 500; 1000 [USP'U]/G; [USP'U]/G
OINTMENT TOPICAL
Qty: 1 EACH | Refills: 1 | Status: SHIPPED | OUTPATIENT
Start: 2022-03-31 | End: 2022-07-25

## 2022-03-31 RX ORDER — ECHINACEA PURPUREA EXTRACT 125 MG
2 TABLET ORAL 4 TIMES DAILY
Qty: 1 EACH | Refills: 3 | Status: SHIPPED | OUTPATIENT
Start: 2022-03-31 | End: 2022-07-25

## 2022-03-31 RX ORDER — OXYMETAZOLINE HYDROCHLORIDE 0.05 G/100ML
2 SPRAY NASAL PRN
Qty: 1 EACH | Refills: 1 | Status: SHIPPED | OUTPATIENT
Start: 2022-03-31 | End: 2022-07-25

## 2022-03-31 NOTE — PATIENT INSTRUCTIONS
DR. Cheyenne Rollins NOSEBLEED INSTRUCTIONS:    - Obtain nasal saline spray over the counter. Spray nasal saline spray or gel multiple times a day (up to 5-6 times throughout the day) in each nostril to help with nasal mucosal moisturization for the next 3 weeks. You can get this over the counter. You cannot over use this!  - Place antibiotic ointment (I.e. polysporin, triple antiobiotic ointment, bactroban) to inside of both nostrils and along the nasal septum twice daily for next 3 weeks then as needed for moisturization.  - Consider humidification machine in the bedroom to help with nasal moisturization  - Avoid nasal trauma nose picking, harsh nasal blowing, rubbing nose after blowing! If you need to blow your nose blow very gently and do not harshly wipe nose afterwards. - In case of another nosebleed: First spray AFRIN (can obtain over the counter) in affected nostril, then saturate a cottonball with Afrin and place it on the affected side and then placing unrelenting digital pressure for at least 15 minutes. After this take the cottonball allowed and reinspected. If still bleeding please repeat. If Bleeding does not stop after 1 hour or you lose a large amount of blood go to emergency department.

## 2022-03-31 NOTE — PROGRESS NOTES
[address] VISIT      Patient Name: John Brice  Medical Record Number:  [id number]  Primary Care Physician:  ERIC Wang - [place]    ChiefComplaint     Chief Complaint   Patient presents with    Epistaxis     pt here today with c/o recurrent nose bleeds. not currently bleeding - Last nose bleed was a couple weeks ago        History of Present Illness     John Brice is an 64 y.o. female previously seen for recurrent epistaxis, last seen by Dr. Candi Brewster on 6/2/2021. Treated with silver nitrate cautery at that time. 4/5/2021 (Dr. Candi Brewster)  - OPERATION PERFORMED:  Cauterization of nasal septum, as well as middle  turbinate. Interval History:   Couple weeks ago had right sided nosebleed lasting approx 15 minutes. On Xeralto. No bleeding since then. She did not have any recent bleeding prior to that episode either. Will occasionally place vasaline in nose, no other nasal emollients or nasal sprays. Past Medical History     Past Medical History:   Diagnosis Date    Cancer ([address]     skin, melanoma    Diabetes mellitus ([address]     DVT (deep venous thrombosis) (HCC)     right leg    Hyperlipidemia     Hypertension     PE (pulmonary thromboembolism) ([address]        Past Surgical History     Past Surgical History:   Procedure Laterality Date    HYSTERECTOMY      NOSE SURGERY      NOSE SURGERY Right 4/5/2021    CAUTERIZATION RIGHT NASAL SEPTUM performed by Belén Velazco MD at [address] [place] [address]         Family History     History reviewed. No pertinent family history.     Social History     Social History     Tobacco Use    Smoking status: Never Smoker    Smokeless tobacco: Never Used   Substance Use Topics    Alcohol use: No    Drug use: No        Allergies     Allergies   Allergen Reactions    Metformin Hcl      Other reaction(s): liver damage    Lisinopril Swelling    Glipizide Other (See Comments) and Rash     Blisters on mouth  Blisters on mouth         Medications     Current Outpatient Medications   Medication Sig Dispense Refill    rivaroxaban (XARELTO) 20 MG TABS tablet Take 20 mg by mouth      bacitracin-polymyxin b (POLYSPORIN) 500-72523 UNIT/GM ointment Apply topically twice daily to inside of both nostrils for 3 weeks then as needed for dry nose. 1 each 1    sodium chloride (OCEAN) 0.65 % nasal spray 2 sprays by Nasal route 4 times daily 1 each 3    oxymetazoline (12 HOUR NASAL SPRAY) 0.05 % nasal spray 2 sprays by Nasal route as needed (for nosebleeds) 1 each 1    meclizine (ANTIVERT) 12.5 MG tablet Take 25 mg by mouth daily      Dulaglutide (TRULICITY) 1.5 ND/2.9ZY SOPN Inject into the skin every 7 days      oxybutynin (DITROPAN) 5 MG tablet Take 5 mg by mouth 3 times daily      metoprolol succinate (TOPROL XL) 50 MG extended release tablet Take 50 mg by mouth daily      amLODIPine (NORVASC) 5 MG tablet Take 5 mg by mouth daily      LEVEMIR FLEXTOUCH 100 UNIT/ML injection pen 28 Units nightly       Vitamin D (CHOLECALCIFEROL) 1000 UNITS CAPS capsule Take 5,000 Units by mouth every 7 days. Saturday      losartan (COZAAR) 25 MG tablet Take 25 mg by mouth daily      rosuvastatin (CRESTOR) 20 MG tablet Take 20 mg by mouth daily      rivaroxaban (XARELTO) 15 MG TABS tablet Take 1 tablet by mouth 2 times daily (with meals) for 21 days. (Patient taking differently: Take 20 mg by mouth 2 times daily (with meals) ) 41 tablet 0     No current facility-administered medications for this visit.        Review of Systems     REVIEW OF SYSTEMS  The following systems were reviewed and revealed the following in addition to any already discussed in the HPI:    CONSTITUTIONAL: no weight loss, no fever, no night sweats, no chills  EYES: no vision changes, no blurry vision  EARS: no hearing loss, no otalgia  NOSE: +epistaxis, no rhinorrhea  THROAT: No voice changes, no sore throat, no dysphagia    PhysicalExam     Vitals:    03/31/22 1108 03/31/22 1112   BP: (!) 143/82 (!) 143/82   Weight: 145 lb (65.8 kg)        PHYSICAL EXAM  BP (!) 143/82   Wt 145 lb (65.8 kg)   BMI 24.13 kg/m²     GENERAL: No acute distress, alert and oriented, no hoarseness  EYES: EOMI, Anti-icteric  NOSE: On anterior rhinoscopy there is no active epistaxis. Nasal mucosa appears dry diffusely. Along the inferior aspect of the right caudal nasal septum there appears to be a area of hypervascularity without overlying scabbing. EARS: Normal external appearance; on portable otomicroscopy:     -Ad: External auditory canal without stenosis, tympanic membrane clear, no middle ear effusions or retractions     -As: External auditory canal without stenosis, tympanic membrane clear, no middle ear effusions or retractions  FACE: HB 1/6 bilaterally, symmetric appearing, sensation equal bilaterally  ORAL CAVITY: No masses or lesions visualized or palpated, uvula is midline, moist mucous membranes  NECK: Normal range of motion, no thyromegaly, trachea is midline, no palpable lymphadenopathy or neck masses, no crepitus  CHEST: Normal respiratory effort, breathing comfortably, no retractions  SKIN: No rashes, normal appearing skin, no evidence of skin lesions/tumors  NEURO: Cranial Nerves 2, 3, 4, 5, 6, 7, 11, 12 intact bilaterally     I have performed a head and neck physical exam personally or was physically present during the key or critical portions of the service. Assessment and Plan     1. Epistaxis  2. Anticoagulated by anticoagulation treatment  3. Nasal mucosa dry  -Had an isolated bleed 3 weeks ago. Bleeding likely secondary to anticoagulated status and dry nasal mucosa.     -Antibiotic ointment inside of both nostrils along the nasal septum twice daily for the next 3 weeks then as needed  -Frequent use of nasal saline sprays or gel multiple times a day  -Consider humidification in the bedroom to help with moisturization  -Avoid nasal trauma including nose picking, harsh nasal blowing, rubbing nose after blowing.  -Epistaxis instructions given in case of future bleeding.  -If continues to bleed despite conservative measures patient instructed to call the office and schedule a follow-up appointment. Can consider cauterization at that time. Follow-Up     Return if symptoms worsen or fail to improve. Dr. [place]o [place] [address]  Department of Otolaryngology/Head and Neck Surgery  3/31/22    Medical Decision Making: The following items were considered in medical decision making:  Independent review of images  Review / order clinical lab tests  Review / order radiology tests  Decision to obtain old records      This note was generated completely or in part utilizing Dragon dictation speech recognition software. Occasionally, words are mistranscribed and despite editing, the text may contain inaccuracies due to incorrect word recognition. If further clarification is needed please contact the office at [phone].

## 2022-06-28 ENCOUNTER — TELEPHONE (OUTPATIENT)
Dept: ENDOCRINOLOGY | Age: 62
End: 2022-06-28

## 2022-07-25 ENCOUNTER — OFFICE VISIT (OUTPATIENT)
Dept: ENT CLINIC | Age: 62
End: 2022-07-25
Payer: MEDICARE

## 2022-07-25 VITALS — HEART RATE: 68 BPM | TEMPERATURE: 96.9 F | SYSTOLIC BLOOD PRESSURE: 165 MMHG | DIASTOLIC BLOOD PRESSURE: 96 MMHG

## 2022-07-25 DIAGNOSIS — R22.0 INTRANASAL MASS: Primary | ICD-10-CM

## 2022-07-25 DIAGNOSIS — T17.1XXA NASAL FOREIGN BODY, INITIAL ENCOUNTER: ICD-10-CM

## 2022-07-25 DIAGNOSIS — J33.9 NASAL POLYP: ICD-10-CM

## 2022-07-25 DIAGNOSIS — R04.0 EPISTAXIS: ICD-10-CM

## 2022-07-25 DIAGNOSIS — Z79.01 ANTICOAGULATED BY ANTICOAGULATION TREATMENT: ICD-10-CM

## 2022-07-25 PROCEDURE — 99214 OFFICE O/P EST MOD 30 MIN: CPT | Performed by: STUDENT IN AN ORGANIZED HEALTH CARE EDUCATION/TRAINING PROGRAM

## 2022-07-25 PROCEDURE — 3017F COLORECTAL CA SCREEN DOC REV: CPT | Performed by: STUDENT IN AN ORGANIZED HEALTH CARE EDUCATION/TRAINING PROGRAM

## 2022-07-25 PROCEDURE — G8420 CALC BMI NORM PARAMETERS: HCPCS | Performed by: STUDENT IN AN ORGANIZED HEALTH CARE EDUCATION/TRAINING PROGRAM

## 2022-07-25 PROCEDURE — 1036F TOBACCO NON-USER: CPT | Performed by: STUDENT IN AN ORGANIZED HEALTH CARE EDUCATION/TRAINING PROGRAM

## 2022-07-25 PROCEDURE — 31231 NASAL ENDOSCOPY DX: CPT | Performed by: STUDENT IN AN ORGANIZED HEALTH CARE EDUCATION/TRAINING PROGRAM

## 2022-07-25 PROCEDURE — G8427 DOCREV CUR MEDS BY ELIG CLIN: HCPCS | Performed by: STUDENT IN AN ORGANIZED HEALTH CARE EDUCATION/TRAINING PROGRAM

## 2022-07-25 RX ORDER — ECHINACEA PURPUREA EXTRACT 125 MG
2 TABLET ORAL 4 TIMES DAILY
Qty: 1 EACH | Refills: 3 | Status: SHIPPED | OUTPATIENT
Start: 2022-07-25 | End: 2022-07-28

## 2022-07-25 RX ORDER — CEPHALEXIN 500 MG/1
500 CAPSULE ORAL 3 TIMES DAILY
Qty: 15 CAPSULE | Refills: 0 | Status: SHIPPED | OUTPATIENT
Start: 2022-07-25 | End: 2022-07-28

## 2022-07-25 RX ORDER — BACITRACIN ZINC AND POLYMYXIN B SULFATE 500; 1000 [USP'U]/G; [USP'U]/G
OINTMENT TOPICAL
Qty: 1 EACH | Refills: 1 | Status: SHIPPED | OUTPATIENT
Start: 2022-07-25 | End: 2022-07-28

## 2022-07-25 RX ORDER — OXYMETAZOLINE HYDROCHLORIDE 0.05 G/100ML
2 SPRAY NASAL PRN
Qty: 1 EACH | Refills: 1 | Status: SHIPPED | OUTPATIENT
Start: 2022-07-25 | End: 2022-07-28

## 2022-07-25 NOTE — PATIENT INSTRUCTIONS
DR. India Fischer NOSEBLEED INSTRUCTIONS:    - Obtain nasal saline spray over the counter. Spray nasal saline spray or gel multiple times a day (up to 5-6 times throughout the day) in each nostril to help with nasal mucosal moisturization for the next 3 weeks. You can get this over the counter. You cannot over use this!  - Place antibiotic ointment (I.e. polysporin, triple antiobiotic ointment, bactroban) to inside of both nostrils and along the nasal septum twice daily for next 3 weeks then as needed for moisturization.  - Consider humidification machine in the bedroom to help with nasal moisturization  - Avoid nasal trauma nose picking, harsh nasal blowing, rubbing nose after blowing! If you need to blow your nose blow very gently and do not harshly wipe nose afterwards. - In case of another nosebleed: First spray AFRIN (can obtain over the counter) in affected nostril, then saturate a cottonball with Afrin and place it on the affected side and then placing unrelenting digital pressure for at least 15 minutes. After this take the cottonball allowed and reinspected. If still bleeding please repeat. If Bleeding does not stop after 1 hour or you lose a large amount of blood go to emergency department.

## 2022-07-25 NOTE — PROGRESS NOTES
[address] VISIT      Patient Name: Evangelist Valentine  Medical Record Number:  [id number]  Primary Care Physician:  ERIC Apple - [place]    ChiefComplaint     Chief Complaint   Patient presents with    Epistaxis     Every time she gets over heated her nose bleeds       History of Present Illness     Evangelist Valentine is an 64 y.o. female previously seen for recurrent epistaxis, last seen 3/31/2022. Also seen by Dr. Jennifer Liang on 6/2/2021, treated with silver nitrate cautery at that time. 4/5/2021 (Dr. Jennifer Liang)  - OPERATION PERFORMED:  Cauterization of nasal septum, as well as middle    Nasal septoplasty w/ turbinoplasty (6/10/13)      Interval History:   Continues to have right sided nosebleeds. On Xarelto. Will occur for 10-15 min. Happening nearly daily now. Has been using nasal saline sprays. No nasal ointment right now. She often will place cotton balls in her right nose to stop the bleeding. Unsure if she had nasal surgery. Think possible nasal polyp surgery by Dr. Marce Morfin. Decreased sense of smell. + nasal congestion. Past Medical History     Past Medical History:   Diagnosis Date    Cancer ([facility]     skin, melanoma    Diabetes mellitus ([address]     DVT (deep venous thrombosis) (HCC)     right leg    Hyperlipidemia     Hypertension     PE (pulmonary thromboembolism) ([address]        Past Surgical History     Past Surgical History:   Procedure Laterality Date    HYSTERECTOMY (CERVIX STATUS UNKNOWN)      NOSE SURGERY      NOSE SURGERY Right 4/5/2021    CAUTERIZATION RIGHT NASAL SEPTUM performed by Elder Giang MD at [address] [place] [address] History     History reviewed. No pertinent family history.     Social History     Social History     Tobacco Use    Smoking status: Never    Smokeless tobacco: Never   Substance Use Topics    Alcohol use: No    Drug use: No        Allergies     Allergies   Allergen Reactions Metformin Hcl      Other reaction(s): liver damage    Lisinopril Swelling    Glipizide Other (See Comments) and Rash     Blisters on mouth  Blisters on mouth         Medications     Current Outpatient Medications   Medication Sig Dispense Refill    bacitracin-polymyxin b (POLYSPORIN) 500-55427 UNIT/GM ointment Apply topically twice daily to inside of both nostrils for 3 weeks then as needed for dry nose. 1 each 1    sodium chloride (OCEAN) 0.65 % nasal spray 2 sprays by Nasal route in the morning and 2 sprays at noon and 2 sprays in the evening and 2 sprays before bedtime. 1 each 3    oxymetazoline (12 HOUR NASAL SPRAY) 0.05 % nasal spray 2 sprays by Nasal route as needed (for nosebleeds) 1 each 1    cephALEXin (KEFLEX) 500 MG capsule Take 1 capsule by mouth in the morning and 1 capsule at noon and 1 capsule before bedtime. Do all this for 5 days. 15 capsule 0    rivaroxaban (XARELTO) 20 MG TABS tablet Take 20 mg by mouth      meclizine (ANTIVERT) 12.5 MG tablet Take 25 mg by mouth daily      Dulaglutide (TRULICITY) 1.5 CC/2.5SH SOPN Inject into the skin every 7 days      metoprolol succinate (TOPROL XL) 50 MG extended release tablet Take 50 mg by mouth daily      LEVEMIR FLEXTOUCH 100 UNIT/ML injection pen 28 Units nightly       Vitamin D (CHOLECALCIFEROL) 1000 UNITS CAPS capsule Take 5,000 Units by mouth every 7 days. Saturday      losartan (COZAAR) 25 MG tablet Take 25 mg by mouth daily      rosuvastatin (CRESTOR) 20 MG tablet Take 20 mg by mouth daily      oxybutynin (DITROPAN) 5 MG tablet Take 5 mg by mouth 3 times daily (Patient not taking: Reported on 7/25/2022)      amLODIPine (NORVASC) 5 MG tablet Take 5 mg by mouth daily (Patient not taking: Reported on 7/25/2022)      rivaroxaban (XARELTO) 15 MG TABS tablet Take 1 tablet by mouth 2 times daily (with meals) for 21 days.  (Patient taking differently: Take 20 mg by mouth 2 times daily (with meals) ) 41 tablet 0     No current facility-administered medications for this visit. Review of Systems     REVIEW OF SYSTEM: See HPI above    PhysicalExam     Vitals:    07/25/22 1313   BP: (!) 165/96   Site: Right Upper Arm   Position: Sitting   Cuff Size: Medium Adult   Pulse: 68   Temp: 96.9 °F (36.1 °C)   TempSrc: Temporal       PHYSICAL EXAM  BP (!) 165/96 (Site: Right Upper Arm, Position: Sitting, Cuff Size: Medium Adult)   Pulse 68   Temp 96.9 °F (36.1 °C) (Temporal)     GENERAL: No acute distress, alert and oriented. EYES: EOMI, Anti-icteric. NOSE: On anterior rhinoscopy there was dense cotton packing within the right nasal passage. This was removed with bayonet forceps and there was 2 separate cotton balls that were covered in mucopurulence that were removed. See procedure note below. EARS: Normal external appearance; no otorrhea  FACE: HB 1/6 bilaterally, symmetric appearing, sensation equal bilaterally  ORAL CAVITY: No masses or lesions visualized or palpated, uvula is midline, moist mucous membranes, no oropharyngeal masses or oropharyngeal obstruction  NECK: Normal range of motion, no thyromegaly, trachea is midline, no palpable lymphadenopathy or neck masses, no crepitus  CHEST: Normal respiratory effort, breathing comfortably, no retractions  SKIN: No rashes, normal appearing skin, no evidence of skin lesions/tumors  NEURO: Cranial Nerves 2, 3, 4, 5, 6, 7, 11, 12 grossly intact bilaterally     I have performed a head and neck physical exam personally or was physically present during the key or critical portions of the service. Procedure     Nasal Endoscopy, Diagnostic (49031)    Pre-op: Epistaxis, recurrent; nasal congestion  Post-op: Recurrent epistaxis, nasal congestion, right intranasal polypoid mass  Procedure: Nasal endoscopy    After obtaining verbal consent from the patient 2% lidocaine with afrin was sprayed into the nasal cavities.   After allowing a time for anesthesia, a 0-degree rigid nasal endoscope was used to examine the nasal septum, turbinates, and mucosal tissue. The middle meatus and sphenoethmoid recess was examined bilaterally. There were no complications. Pertinent findings include: There was a polypoid mass emanating from the middle meatus on the right nearly touching the nasal floor. Posterior to this there was dense mucopurulent drainage that was suctioned with #7 French suction. In the left nasal passage there was no nasal polyps or nasal masses. *The patient tolerated the procedure well without complication. I attest that I was present for and did the entire procedure myself. Assessment and Plan     1. Intranasal mass  2. Nasal polyp  -We will further characterize with CT sinus. Patient will have this performed and follow-up afterwards to discuss results and further work-up and management  - CT SINUS FOR IMAGE GUIDANCE; Future    3. Nasal foreign body, initial encounter  -Removed mucopurulent covered cotton balls from the right nasal passage. We will place her on Keflex x5 days to decrease bacterial load from the foreign body. 4. Epistaxis  5. Anticoagulated by anticoagulation treatment  -We will hold off on cauterization or packing today to avoid any type of placement of packing that may obscure CT scan finding results. Start Polysporin ointment to bilateral nasal passages twice daily. Continue nasal saline sprays. Afrin as needed bleeding. If she experiences any heavy bleeding she will call the office or present to the emergency department. Follow-Up     Return for after imaging. Kelsy Treviño   Department of Otolaryngology/Head and Neck Surgery  7/25/22    Medical Decision Making:   The following items were considered in medical decision making:  Independent review of images  Review / order clinical lab tests  Review / order radiology tests  Decision to obtain old records      This note was generated completely or in part utilizing Dragon dictation speech recognition software. Occasionally, words are mistranscribed and despite editing, the text may contain inaccuracies due to incorrect word recognition. If further clarification is needed please contact the office at [phone].

## 2022-07-26 ENCOUNTER — TELEPHONE (OUTPATIENT)
Dept: ENT CLINIC | Age: 62
End: 2022-07-26

## 2022-07-26 NOTE — TELEPHONE ENCOUNTER
[facility] called she states the patient Jossy Alfonso) called there to let them know the medication was not suppose to be sent to them it is suppose to be sent to  [facility] mail order pharmacy. Polly Compa States the patient told her she didn't have our office number Nella Gomez told her she will give us a call and Ask can it be resend to [facility]. She also states the patient said she told the lady she checked out with yesterday where it should go to.

## 2022-07-27 ENCOUNTER — TELEPHONE (OUTPATIENT)
Dept: ENT CLINIC | Age: 62
End: 2022-07-27

## 2022-07-27 NOTE — TELEPHONE ENCOUNTER
The prescriptions that were ordered can you reorder them and send them to [facility]. Patient doesn't have transportation to  the medications.

## 2022-07-28 RX ORDER — CEPHALEXIN 500 MG/1
500 CAPSULE ORAL 3 TIMES DAILY
Qty: 15 CAPSULE | Refills: 0 | Status: SHIPPED | OUTPATIENT
Start: 2022-07-28 | End: 2022-08-02

## 2022-07-28 RX ORDER — ECHINACEA PURPUREA EXTRACT 125 MG
2 TABLET ORAL 4 TIMES DAILY
Qty: 1 EACH | Refills: 3 | Status: SHIPPED | OUTPATIENT
Start: 2022-07-28

## 2022-07-28 RX ORDER — OXYMETAZOLINE HYDROCHLORIDE 0.05 G/100ML
2 SPRAY NASAL PRN
Qty: 1 EACH | Refills: 1 | Status: SHIPPED | OUTPATIENT
Start: 2022-07-28 | End: 2023-07-28

## 2022-07-28 RX ORDER — BACITRACIN ZINC AND POLYMYXIN B SULFATE 500; 1000 [USP'U]/G; [USP'U]/G
OINTMENT TOPICAL
Qty: 1 EACH | Refills: 1 | Status: SHIPPED | OUTPATIENT
Start: 2022-07-28

## 2022-08-10 ENCOUNTER — HOSPITAL ENCOUNTER (OUTPATIENT)
Dept: CT IMAGING | Age: 62
Discharge: HOME | End: 2022-08-10
Payer: MEDICARE

## 2022-08-10 DIAGNOSIS — R22.0 INTRANASAL MASS: ICD-10-CM

## 2022-08-10 DIAGNOSIS — J33.9 NASAL POLYP: ICD-10-CM

## 2022-08-10 PROCEDURE — 70486 CT MAXILLOFACIAL W/O DYE: CPT

## 2022-09-12 ENCOUNTER — OFFICE VISIT (OUTPATIENT)
Dept: ENT CLINIC | Age: 62
End: 2022-09-12
Payer: MEDICARE

## 2022-09-12 VITALS — SYSTOLIC BLOOD PRESSURE: 133 MMHG | DIASTOLIC BLOOD PRESSURE: 89 MMHG | HEART RATE: 84 BPM | TEMPERATURE: 97.3 F

## 2022-09-12 DIAGNOSIS — R04.0 EPISTAXIS: ICD-10-CM

## 2022-09-12 DIAGNOSIS — J34.89 NASAL MUCOSA DRY: ICD-10-CM

## 2022-09-12 DIAGNOSIS — J32.4 CHRONIC PANSINUSITIS: Primary | ICD-10-CM

## 2022-09-12 DIAGNOSIS — J30.9 ALLERGIC RHINITIS, UNSPECIFIED SEASONALITY, UNSPECIFIED TRIGGER: ICD-10-CM

## 2022-09-12 PROCEDURE — 3017F COLORECTAL CA SCREEN DOC REV: CPT | Performed by: STUDENT IN AN ORGANIZED HEALTH CARE EDUCATION/TRAINING PROGRAM

## 2022-09-12 PROCEDURE — 99214 OFFICE O/P EST MOD 30 MIN: CPT | Performed by: STUDENT IN AN ORGANIZED HEALTH CARE EDUCATION/TRAINING PROGRAM

## 2022-09-12 PROCEDURE — G8420 CALC BMI NORM PARAMETERS: HCPCS | Performed by: STUDENT IN AN ORGANIZED HEALTH CARE EDUCATION/TRAINING PROGRAM

## 2022-09-12 PROCEDURE — G8427 DOCREV CUR MEDS BY ELIG CLIN: HCPCS | Performed by: STUDENT IN AN ORGANIZED HEALTH CARE EDUCATION/TRAINING PROGRAM

## 2022-09-12 PROCEDURE — 1036F TOBACCO NON-USER: CPT | Performed by: STUDENT IN AN ORGANIZED HEALTH CARE EDUCATION/TRAINING PROGRAM

## 2022-09-12 RX ORDER — LORATADINE 10 MG/1
10 TABLET ORAL DAILY
Qty: 30 TABLET | Refills: 2 | Status: SHIPPED | OUTPATIENT
Start: 2022-09-12

## 2022-09-12 NOTE — PROGRESS NOTES
[address] VISIT      Patient Name: Marisela Horn  Medical Record Number:  [id number]  Primary Care Physician:  Murtaza Powell, APRN - [place]    ChiefComplaint     Chief Complaint   Patient presents with    Other     Review CT, no nose bleeds        History of Present Illness     Marisela Horn is an 64 y.o. female previously seen for recurrent epistaxis on Xarelto, intranasal mass, nasal foreign body. Interval History:   Denies nasal congestion. No mucopurulent nasal discharge. Sense of smell intact. No recent epistaxis. No facial pressure or pain. Sneezing a lot recently. No allergy pills. Past Medical History     Past Medical History:   Diagnosis Date    Cancer ([facility]     skin, melanoma    Diabetes mellitus ([facility]     DVT (deep venous thrombosis) (HCC)     right leg    Hyperlipidemia     Hypertension     PE (pulmonary thromboembolism) ([facility]        Past Surgical History     Past Surgical History:   Procedure Laterality Date    HYSTERECTOMY (CERVIX STATUS UNKNOWN)      NOSE SURGERY      NOSE SURGERY Right 4/5/2021    CAUTERIZATION RIGHT NASAL SEPTUM performed by Linden Jose MD at [address] [place] [address] History     History reviewed. No pertinent family history.     Social History     Social History     Tobacco Use    Smoking status: Never    Smokeless tobacco: Never   Substance Use Topics    Alcohol use: No    Drug use: No        Allergies     Allergies   Allergen Reactions    Metformin Hcl      Other reaction(s): liver damage    Lisinopril Swelling    Glipizide Other (See Comments) and Rash     Blisters on mouth  Blisters on mouth         Medications     Current Outpatient Medications   Medication Sig Dispense Refill    loratadine (CLARITIN) 10 MG tablet Take 1 tablet by mouth daily 30 tablet 2    oxymetazoline (12 HOUR NASAL SPRAY) 0.05 % nasal spray 2 sprays by Nasal route as needed (for nosebleeds) 1 each 1 sodium chloride (OCEAN) 0.65 % nasal spray 2 sprays by Nasal route in the morning and 2 sprays at noon and 2 sprays in the evening and 2 sprays before bedtime. 1 each 3    meclizine (ANTIVERT) 12.5 MG tablet Take 25 mg by mouth daily      Dulaglutide (TRULICITY) 1.5 KN/2.4VO SOPN Inject into the skin every 7 days      losartan (COZAAR) 25 MG tablet Take 25 mg by mouth daily      oxybutynin (DITROPAN) 5 MG tablet Take 5 mg by mouth 3 times daily      metoprolol succinate (TOPROL XL) 50 MG extended release tablet Take 50 mg by mouth daily      LEVEMIR FLEXTOUCH 100 UNIT/ML injection pen 28 Units nightly       rivaroxaban (XARELTO) 15 MG TABS tablet Take 1 tablet by mouth 2 times daily (with meals) for 21 days. (Patient taking differently: Take 20 mg by mouth 2 times daily (with meals)) 41 tablet 0    Vitamin D (CHOLECALCIFEROL) 1000 UNITS CAPS capsule Take 5,000 Units by mouth every 7 days. Saturday      bacitracin-polymyxin b (POLYSPORIN) 500-90854 UNIT/GM ointment Apply topically twice daily to inside of both nostrils for 3 weeks then as needed for dry nose. (Patient not taking: Reported on 9/12/2022) 1 each 1    rivaroxaban (XARELTO) 20 MG TABS tablet Take 20 mg by mouth (Patient not taking: Reported on 9/12/2022)      rosuvastatin (CRESTOR) 20 MG tablet Take 20 mg by mouth daily      amLODIPine (NORVASC) 5 MG tablet Take 5 mg by mouth daily (Patient not taking: No sig reported)       No current facility-administered medications for this visit. Review of Systems     REVIEW OF SYSTEM: See HPI above    PhysicalExam     Vitals:    09/12/22 1123   BP: 133/89   Pulse: 84   Temp: 97.3 °F (36.3 °C)   TempSrc: Temporal       PHYSICAL EXAM  /89   Pulse 84   Temp 97.3 °F (36.3 °C) (Temporal)     GENERAL: No acute distress, alert and oriented. EYES: EOMI, Anti-icteric. NOSE: On anterior rhinoscopy there is no epistaxis, nasal mucosa moist and normal appearing, no purulent drainage.   Nasal septum deviated to the left. Bilateral inferior turbinates hypertrophied. No mucopurulent drainage. No foreign bodies. EARS: Normal external appearance; on portable otomicroscopy:     -Ad: External auditory canal without stenosis, tympanic membrane clear, no middle ear effusions or retractions     -As: External auditory canal without stenosis, tympanic membrane clear, no middle ear effusions or retractions  FACE: HB 1/6 bilaterally, symmetric appearing, sensation equal bilaterally  ORAL CAVITY: No masses or lesions visualized or palpated, uvula is midline, moist mucous membranes, no oropharyngeal masses or oropharyngeal obstruction  NECK: Normal range of motion, no thyromegaly, trachea is midline, no palpable lymphadenopathy or neck masses, no crepitus  CHEST: Normal respiratory effort, breathing comfortably, no retractions  SKIN: No rashes, normal appearing skin, no evidence of skin lesions/tumors  NEURO: Cranial Nerves 2, 3, 4, 5, 6, 7, 11, 12 grossly intact bilaterally     I have performed a head and neck physical exam personally or was physically present during the key or critical portions of the service. Data/Imaging Review     EXAMINATION:   CT OF THE SINUS WITHOUT CONTRAST 8/10/2022 11:00 am       TECHNIQUE:   CT of the sinuses was performed without the administration of intravenous   contrast. Multiplanar reformatted images are provided for review. Automated   exposure control, iterative reconstruction, and/or weight based adjustment of   the mA/kV was utilized to reduce the radiation dose to as low as reasonably   achievable. COMPARISON:   12/04/2020       HISTORY:   ORDERING SYSTEM PROVIDED HISTORY: Intranasal mass   TECHNOLOGIST PROVIDED HISTORY:   Reason for Exam: Intranasal mass       FINDINGS:   LEFT OMC: There is mucosal thickening of the left ostiomeatal unit and   frontoethmoidal recess. There is mild mucosal thickening in the maxillary   sinus. Ethmoid sinus is partially opacified.   There is unspecified seasonality, unspecified trigger  -Claritin daily    3. Nasal mucosa dry  4. Epistaxis  -On Xarelto. No recent epistaxis. Sinonasal saline irrigations should help with moisturization. Follow-Up     Return if symptoms worsen or fail to improve. Dr. Lavern [place] [address]  Department of Otolaryngology/Head and Neck Surgery  9/12/22    Medical Decision Making: The following items were considered in medical decision making:  Independent review of images  Review / order clinical lab tests  Review / order radiology tests  Decision to obtain old records      This note was generated completely or in part utilizing Dragon dictation speech recognition software. Occasionally, words are mistranscribed and despite editing, the text may contain inaccuracies due to incorrect word recognition. If further clarification is needed please contact the office at [phone].

## 2023-08-10 ENCOUNTER — OFFICE VISIT (OUTPATIENT)
Dept: ENT CLINIC | Age: 63
End: 2023-08-10

## 2023-08-10 VITALS
OXYGEN SATURATION: 94 % | BODY MASS INDEX: 22.16 KG/M2 | HEART RATE: 77 BPM | DIASTOLIC BLOOD PRESSURE: 88 MMHG | HEIGHT: 65 IN | TEMPERATURE: 97.5 F | SYSTOLIC BLOOD PRESSURE: 166 MMHG | WEIGHT: 133 LBS

## 2023-08-10 DIAGNOSIS — R04.0 EPISTAXIS: Primary | ICD-10-CM

## 2023-08-10 DIAGNOSIS — Z79.01 ANTICOAGULATED BY ANTICOAGULATION TREATMENT: ICD-10-CM

## 2023-08-10 DIAGNOSIS — J34.89 NASAL MUCOSA DRY: ICD-10-CM

## 2023-08-10 NOTE — PATIENT INSTRUCTIONS
DR. James Piedra NOSEBLEED INSTRUCTIONS:    - Obtain nasal saline spray over the counter. Spray nasal saline spray or gel multiple times a day (up to 5-6 times throughout the day) in each nostril to help with nasal mucosal moisturization for the next 3 weeks. You can get this over the counter. You cannot over use this!  - Place antibiotic ointment (I.e. polysporin, triple antiobiotic ointment, bactroban) to inside of both nostrils and along the nasal septum twice daily for next 3 weeks then as needed for moisturization.  - Consider humidification machine in the bedroom to help with nasal moisturization  - Avoid nasal trauma nose picking, harsh nasal blowing, rubbing nose after blowing! If you need to blow your nose blow very gently and do not harshly wipe nose afterwards. - In case of another nosebleed: First spray AFRIN (can obtain over the counter) in affected nostril, then saturate a cottonball with Afrin and place it on the affected side and then placing unrelenting digital pressure for at least 15 minutes. After this take the cottonball allowed and reinspected. If still bleeding please repeat. If Bleeding does not stop after 1 hour or you lose a large amount of blood go to emergency department.

## 2023-12-11 ENCOUNTER — OFFICE VISIT (OUTPATIENT)
Dept: ENT CLINIC | Age: 63
End: 2023-12-11
Payer: MEDICARE

## 2023-12-11 VITALS
HEIGHT: 65 IN | DIASTOLIC BLOOD PRESSURE: 85 MMHG | SYSTOLIC BLOOD PRESSURE: 130 MMHG | HEART RATE: 93 BPM | TEMPERATURE: 97.1 F | WEIGHT: 130 LBS | OXYGEN SATURATION: 97 % | BODY MASS INDEX: 21.66 KG/M2

## 2023-12-11 DIAGNOSIS — R04.0 EPISTAXIS: Primary | ICD-10-CM

## 2023-12-11 DIAGNOSIS — J34.89 NASAL MUCOSA DRY: ICD-10-CM

## 2023-12-11 DIAGNOSIS — Z79.01 ANTICOAGULATED BY ANTICOAGULATION TREATMENT: ICD-10-CM

## 2023-12-11 PROCEDURE — G8427 DOCREV CUR MEDS BY ELIG CLIN: HCPCS | Performed by: STUDENT IN AN ORGANIZED HEALTH CARE EDUCATION/TRAINING PROGRAM

## 2023-12-11 PROCEDURE — 1036F TOBACCO NON-USER: CPT | Performed by: STUDENT IN AN ORGANIZED HEALTH CARE EDUCATION/TRAINING PROGRAM

## 2023-12-11 PROCEDURE — 3017F COLORECTAL CA SCREEN DOC REV: CPT | Performed by: STUDENT IN AN ORGANIZED HEALTH CARE EDUCATION/TRAINING PROGRAM

## 2023-12-11 PROCEDURE — G8420 CALC BMI NORM PARAMETERS: HCPCS | Performed by: STUDENT IN AN ORGANIZED HEALTH CARE EDUCATION/TRAINING PROGRAM

## 2023-12-11 PROCEDURE — 99213 OFFICE O/P EST LOW 20 MIN: CPT | Performed by: STUDENT IN AN ORGANIZED HEALTH CARE EDUCATION/TRAINING PROGRAM

## 2023-12-11 PROCEDURE — G8484 FLU IMMUNIZE NO ADMIN: HCPCS | Performed by: STUDENT IN AN ORGANIZED HEALTH CARE EDUCATION/TRAINING PROGRAM

## 2023-12-11 RX ORDER — OXYMETAZOLINE HYDROCHLORIDE 0.05 G/100ML
2 SPRAY NASAL PRN
Qty: 1 EACH | Refills: 1 | Status: SHIPPED | OUTPATIENT
Start: 2023-12-11 | End: 2024-12-10

## 2023-12-11 RX ORDER — ECHINACEA PURPUREA EXTRACT 125 MG
2 TABLET ORAL 4 TIMES DAILY
Qty: 1 EACH | Refills: 3 | Status: SHIPPED | OUTPATIENT
Start: 2023-12-11

## 2023-12-11 NOTE — PATIENT INSTRUCTIONS
DR. Juan C Bourne NOSEBLEED INSTRUCTIONS:    - Obtain nasal saline spray over the counter. Spray nasal saline spray or gel multiple times a day (up to 5-6 times throughout the day) in each nostril to help with nasal mucosal moisturization for the next 3 weeks. You can get this over the counter. You cannot over use this!  - Place antibiotic ointment (I.e. polysporin, triple antiobiotic ointment, bactroban) to inside of both nostrils and along the nasal septum twice daily for next 3 weeks then as needed for moisturization.  - Consider humidification machine in the bedroom to help with nasal moisturization  - Avoid nasal trauma nose picking, harsh nasal blowing, rubbing nose after blowing! If you need to blow your nose blow very gently and do not harshly wipe nose afterwards. - In case of another nosebleed: First spray AFRIN (can obtain over the counter) in affected nostril, then saturate a cottonball with Afrin and place it on the affected side and then placing unrelenting digital pressure for at least 15 minutes. After this take the cottonball allowed and reinspected. If still bleeding please repeat. If Bleeding does not stop after 1 hour or you lose a large amount of blood go to emergency department.

## (undated) DEVICE — MEDI-VAC NON-CONDUCTIVE SUCTION TUBING: Brand: CARDINAL HEALTH

## (undated) DEVICE — COAGULATOR SUCT 10FR LAIN FTSWCH ACTIVATION DISP VALLEYLAB

## (undated) DEVICE — COVER,MAYO STAND,STERILE: Brand: MEDLINE

## (undated) DEVICE — PENCIL ES L3M BTTN SWCH S STL HEX LOK BLDE ELECTRD HOLSTER

## (undated) DEVICE — MEDICINE CUP, GRADUATED, STER: Brand: MEDLINE

## (undated) DEVICE — TOWEL,OR,DSP,ST,BLUE,STD,4/PK,20PK/CS: Brand: MEDLINE

## (undated) DEVICE — SPONGE ABSRB L2XW2IN TYP VII GZ 12 PLY XR DTECT LTX FREE WVN

## (undated) DEVICE — GAUZE,SPONGE,4"X4",8PLY,STRL,LF,10/TRAY: Brand: MEDLINE

## (undated) DEVICE — STERILE LATEX POWDER-FREE SURGICAL GLOVESWITH NITRILE COATING: Brand: PROTEXIS

## (undated) DEVICE — YANKAUER SUCTION INSTRUMENT NO CONTROL VENT, BULB TIP, CLEAR: Brand: YANKAUER

## (undated) DEVICE — SOLUTION IV IRRIG 500ML 0.9% SODIUM CHL 2F7123

## (undated) DEVICE — CONMED ACCESSORY ELECTRODE, 6 INCH (15.24 CM) NEEDLE: Brand: CONMED